# Patient Record
Sex: FEMALE | Race: WHITE | Employment: FULL TIME | URBAN - METROPOLITAN AREA
[De-identification: names, ages, dates, MRNs, and addresses within clinical notes are randomized per-mention and may not be internally consistent; named-entity substitution may affect disease eponyms.]

---

## 2017-03-30 ENCOUNTER — OFFICE VISIT (OUTPATIENT)
Dept: FAMILY MEDICINE CLINIC | Age: 32
End: 2017-03-30

## 2017-03-30 VITALS
BODY MASS INDEX: 37.34 KG/M2 | WEIGHT: 190.2 LBS | SYSTOLIC BLOOD PRESSURE: 128 MMHG | HEIGHT: 60 IN | HEART RATE: 94 BPM | RESPIRATION RATE: 18 BRPM | OXYGEN SATURATION: 98 % | DIASTOLIC BLOOD PRESSURE: 78 MMHG

## 2017-03-30 DIAGNOSIS — Z76.89 ENCOUNTER TO ESTABLISH CARE: ICD-10-CM

## 2017-03-30 DIAGNOSIS — G44.229 CHRONIC TENSION-TYPE HEADACHE, NOT INTRACTABLE: ICD-10-CM

## 2017-03-30 DIAGNOSIS — M54.50 CHRONIC BILATERAL LOW BACK PAIN WITHOUT SCIATICA: ICD-10-CM

## 2017-03-30 DIAGNOSIS — Z11.4 SCREENING FOR HIV WITHOUT PRESENCE OF RISK FACTORS: Primary | ICD-10-CM

## 2017-03-30 DIAGNOSIS — E66.09 NON MORBID OBESITY DUE TO EXCESS CALORIES: ICD-10-CM

## 2017-03-30 DIAGNOSIS — G89.29 CHRONIC BILATERAL LOW BACK PAIN WITHOUT SCIATICA: ICD-10-CM

## 2017-03-30 LAB
A/G RATIO: 1.3 (ref 1.1–2.2)
ALBUMIN SERPL-MCNC: 4.1 G/DL (ref 3.4–5)
ALP BLD-CCNC: 87 U/L (ref 40–129)
ALT SERPL-CCNC: 21 U/L (ref 10–40)
ANION GAP SERPL CALCULATED.3IONS-SCNC: 15 MMOL/L (ref 3–16)
AST SERPL-CCNC: 16 U/L (ref 15–37)
BILIRUB SERPL-MCNC: 0.6 MG/DL (ref 0–1)
BUN BLDV-MCNC: 10 MG/DL (ref 7–20)
CALCIUM SERPL-MCNC: 9.2 MG/DL (ref 8.3–10.6)
CHLORIDE BLD-SCNC: 102 MMOL/L (ref 99–110)
CHOLESTEROL, TOTAL: 168 MG/DL (ref 0–199)
CO2: 22 MMOL/L (ref 21–32)
CREAT SERPL-MCNC: 0.7 MG/DL (ref 0.6–1.1)
GFR AFRICAN AMERICAN: >60
GFR NON-AFRICAN AMERICAN: >60
GLOBULIN: 3.1 G/DL
GLUCOSE BLD-MCNC: 90 MG/DL (ref 70–99)
HDLC SERPL-MCNC: 57 MG/DL (ref 40–60)
LDL CHOLESTEROL CALCULATED: 96 MG/DL
POTASSIUM SERPL-SCNC: 4.4 MMOL/L (ref 3.5–5.1)
SODIUM BLD-SCNC: 139 MMOL/L (ref 136–145)
TOTAL PROTEIN: 7.2 G/DL (ref 6.4–8.2)
TRIGL SERPL-MCNC: 77 MG/DL (ref 0–150)
TSH REFLEX: 2.95 UIU/ML (ref 0.27–4.2)
VLDLC SERPL CALC-MCNC: 15 MG/DL

## 2017-03-30 PROCEDURE — 36415 COLL VENOUS BLD VENIPUNCTURE: CPT | Performed by: FAMILY MEDICINE

## 2017-03-30 PROCEDURE — 99203 OFFICE O/P NEW LOW 30 MIN: CPT | Performed by: FAMILY MEDICINE

## 2017-03-30 RX ORDER — AMITRIPTYLINE HYDROCHLORIDE 25 MG/1
25 TABLET, FILM COATED ORAL NIGHTLY
COMMUNITY
End: 2017-04-20

## 2017-03-30 RX ORDER — TIZANIDINE 4 MG/1
TABLET ORAL
Qty: 30 TABLET | Refills: 1 | Status: SHIPPED | OUTPATIENT
Start: 2017-03-30 | End: 2017-07-05 | Stop reason: SDUPTHER

## 2017-03-30 ASSESSMENT — ENCOUNTER SYMPTOMS
BACK PAIN: 1
CONSTIPATION: 0
SORE THROAT: 0
SHORTNESS OF BREATH: 0
DIARRHEA: 0
VOICE CHANGE: 0
VOMITING: 0
COUGH: 1
EYE ITCHING: 0
NAUSEA: 0
ABDOMINAL PAIN: 0
EYE PAIN: 0

## 2017-03-30 ASSESSMENT — PATIENT HEALTH QUESTIONNAIRE - PHQ9
1. LITTLE INTEREST OR PLEASURE IN DOING THINGS: 1
SUM OF ALL RESPONSES TO PHQ9 QUESTIONS 1 & 2: 2
2. FEELING DOWN, DEPRESSED OR HOPELESS: 1
SUM OF ALL RESPONSES TO PHQ QUESTIONS 1-9: 2

## 2017-03-31 LAB
HIV-1 AND HIV-2 ANTIBODIES: NORMAL
TSH SERPL DL<=0.05 MIU/L-ACNC: 3.19 UIU/ML (ref 0.27–4.2)

## 2017-04-20 ENCOUNTER — OFFICE VISIT (OUTPATIENT)
Dept: FAMILY MEDICINE CLINIC | Age: 32
End: 2017-04-20

## 2017-04-20 VITALS
WEIGHT: 188.6 LBS | SYSTOLIC BLOOD PRESSURE: 122 MMHG | BODY MASS INDEX: 37.03 KG/M2 | DIASTOLIC BLOOD PRESSURE: 80 MMHG | OXYGEN SATURATION: 98 % | HEART RATE: 83 BPM | HEIGHT: 60 IN

## 2017-04-20 DIAGNOSIS — G89.29 CHRONIC BILATERAL LOW BACK PAIN WITHOUT SCIATICA: Primary | ICD-10-CM

## 2017-04-20 DIAGNOSIS — E66.09 NON MORBID OBESITY DUE TO EXCESS CALORIES: ICD-10-CM

## 2017-04-20 DIAGNOSIS — M54.50 CHRONIC BILATERAL LOW BACK PAIN WITHOUT SCIATICA: Primary | ICD-10-CM

## 2017-04-20 PROCEDURE — 99213 OFFICE O/P EST LOW 20 MIN: CPT | Performed by: FAMILY MEDICINE

## 2017-04-20 RX ORDER — TIZANIDINE 4 MG/1
TABLET ORAL
Qty: 30 TABLET | Refills: 3 | Status: CANCELLED | OUTPATIENT
Start: 2017-04-20

## 2017-04-20 ASSESSMENT — ENCOUNTER SYMPTOMS
COUGH: 0
NAUSEA: 0
BACK PAIN: 1
SCALP TENDERNESS: 0
SORE THROAT: 0
SINUS PRESSURE: 0
VOMITING: 0
PHOTOPHOBIA: 1

## 2017-06-15 ENCOUNTER — OFFICE VISIT (OUTPATIENT)
Dept: FAMILY MEDICINE CLINIC | Age: 32
End: 2017-06-15

## 2017-06-15 VITALS
HEIGHT: 60 IN | OXYGEN SATURATION: 98 % | DIASTOLIC BLOOD PRESSURE: 68 MMHG | TEMPERATURE: 98.5 F | SYSTOLIC BLOOD PRESSURE: 124 MMHG | HEART RATE: 72 BPM | BODY MASS INDEX: 36.99 KG/M2 | WEIGHT: 188.4 LBS

## 2017-06-15 DIAGNOSIS — R21 RASH OF FACE: ICD-10-CM

## 2017-06-15 DIAGNOSIS — H10.503 BLEPHAROCONJUNCTIVITIS OF BOTH EYES, UNSPECIFIED BLEPHAROCONJUNCTIVITIS TYPE: Primary | ICD-10-CM

## 2017-06-15 PROCEDURE — 99213 OFFICE O/P EST LOW 20 MIN: CPT | Performed by: FAMILY MEDICINE

## 2017-06-15 RX ORDER — DIPHENHYDRAMINE HCL 25 MG
25 CAPSULE ORAL EVERY 4 HOURS PRN
Qty: 20 CAPSULE | Refills: 1 | Status: SHIPPED | OUTPATIENT
Start: 2017-06-15 | End: 2017-06-24 | Stop reason: SDUPTHER

## 2017-06-15 RX ORDER — OFLOXACIN 3 MG/ML
2 SOLUTION/ DROPS OPHTHALMIC 4 TIMES DAILY
Qty: 10 ML | Refills: 0 | Status: SHIPPED | OUTPATIENT
Start: 2017-06-15 | End: 2017-06-25

## 2017-06-15 ASSESSMENT — ENCOUNTER SYMPTOMS
SORE THROAT: 1
WHEEZING: 0
CONSTIPATION: 0
BACK PAIN: 1
SPUTUM PRODUCTION: 0
SHORTNESS OF BREATH: 0
EYE DISCHARGE: 1
COUGH: 1
HEMOPTYSIS: 0
DIARRHEA: 0
BLURRED VISION: 0
ABDOMINAL PAIN: 0
VOMITING: 0
NAUSEA: 0
DOUBLE VISION: 0
EYE PAIN: 0
EYE REDNESS: 1

## 2017-06-24 DIAGNOSIS — H10.503 BLEPHAROCONJUNCTIVITIS OF BOTH EYES, UNSPECIFIED BLEPHAROCONJUNCTIVITIS TYPE: ICD-10-CM

## 2017-06-24 DIAGNOSIS — R21 RASH OF FACE: ICD-10-CM

## 2017-06-26 RX ORDER — DIPHENHYDRAMINE HCL 25 MG
CAPSULE ORAL
Qty: 20 CAPSULE | Refills: 0 | Status: SHIPPED | OUTPATIENT
Start: 2017-06-26 | End: 2017-07-07 | Stop reason: SDUPTHER

## 2017-07-05 DIAGNOSIS — M54.50 CHRONIC BILATERAL LOW BACK PAIN WITHOUT SCIATICA: ICD-10-CM

## 2017-07-05 DIAGNOSIS — G89.29 CHRONIC BILATERAL LOW BACK PAIN WITHOUT SCIATICA: ICD-10-CM

## 2017-07-05 RX ORDER — TIZANIDINE 4 MG/1
TABLET ORAL
Qty: 30 TABLET | Refills: 1 | Status: SHIPPED | OUTPATIENT
Start: 2017-07-05 | End: 2017-08-21 | Stop reason: SDUPTHER

## 2017-07-27 ENCOUNTER — OFFICE VISIT (OUTPATIENT)
Dept: FAMILY MEDICINE CLINIC | Age: 32
End: 2017-07-27

## 2017-07-27 VITALS
HEART RATE: 68 BPM | DIASTOLIC BLOOD PRESSURE: 88 MMHG | OXYGEN SATURATION: 97 % | WEIGHT: 186 LBS | BODY MASS INDEX: 36.33 KG/M2 | RESPIRATION RATE: 18 BRPM | SYSTOLIC BLOOD PRESSURE: 130 MMHG

## 2017-07-27 DIAGNOSIS — G43.009 MIGRAINE WITHOUT AURA AND WITHOUT STATUS MIGRAINOSUS, NOT INTRACTABLE: Primary | ICD-10-CM

## 2017-07-27 DIAGNOSIS — R03.0 HIGH BLOOD PRESSURE (NOT HYPERTENSION): ICD-10-CM

## 2017-07-27 PROCEDURE — 99214 OFFICE O/P EST MOD 30 MIN: CPT | Performed by: FAMILY MEDICINE

## 2017-07-27 RX ORDER — HYDROCHLOROTHIAZIDE 25 MG/1
TABLET ORAL
Qty: 30 TABLET | Refills: 3 | Status: SHIPPED | OUTPATIENT
Start: 2017-07-27 | End: 2018-02-01 | Stop reason: ALTCHOICE

## 2017-07-27 RX ORDER — SUMATRIPTAN 100 MG/1
100 TABLET, FILM COATED ORAL
Qty: 9 TABLET | Refills: 3 | Status: SHIPPED | OUTPATIENT
Start: 2017-07-27 | End: 2018-02-01 | Stop reason: ALTCHOICE

## 2017-07-27 ASSESSMENT — ENCOUNTER SYMPTOMS
DIARRHEA: 0
CONSTIPATION: 0
VOMITING: 0
SHORTNESS OF BREATH: 0
HEMOPTYSIS: 0
EYE DISCHARGE: 0
COUGH: 0
ABDOMINAL PAIN: 0
BLURRED VISION: 0
SPUTUM PRODUCTION: 0
DOUBLE VISION: 0
WHEEZING: 0
EYE PAIN: 0
SORE THROAT: 0
NAUSEA: 0
EYE REDNESS: 0
BACK PAIN: 1

## 2017-08-21 DIAGNOSIS — G89.29 CHRONIC BILATERAL LOW BACK PAIN WITHOUT SCIATICA: ICD-10-CM

## 2017-08-21 DIAGNOSIS — M54.50 CHRONIC BILATERAL LOW BACK PAIN WITHOUT SCIATICA: ICD-10-CM

## 2017-08-22 RX ORDER — TIZANIDINE 4 MG/1
TABLET ORAL
Qty: 30 TABLET | Refills: 5 | Status: SHIPPED | OUTPATIENT
Start: 2017-08-22 | End: 2018-02-01 | Stop reason: ALTCHOICE

## 2017-11-13 ENCOUNTER — HOSPITAL ENCOUNTER (OUTPATIENT)
Dept: GENERAL RADIOLOGY | Age: 32
Discharge: HOME | End: 2017-11-13
Attending: FAMILY MEDICINE | Admitting: FAMILY MEDICINE

## 2017-11-13 DIAGNOSIS — M26.629 TMJ SYNDROME: ICD-10-CM

## 2018-07-19 ENCOUNTER — HOSPITAL ENCOUNTER (OUTPATIENT)
Dept: ULTRASOUND IMAGING | Age: 33
Discharge: HOME | End: 2018-07-19
Attending: FAMILY MEDICINE | Admitting: FAMILY MEDICINE

## 2018-07-19 DIAGNOSIS — R10.31 RIGHT LOWER QUADRANT PAIN: ICD-10-CM

## 2018-07-30 ENCOUNTER — OFFICE VISIT (OUTPATIENT)
Dept: GASTROENTEROLOGY | Age: 33
End: 2018-07-30

## 2018-07-30 VITALS
WEIGHT: 185 LBS | SYSTOLIC BLOOD PRESSURE: 122 MMHG | HEIGHT: 60 IN | HEART RATE: 79 BPM | BODY MASS INDEX: 36.32 KG/M2 | OXYGEN SATURATION: 98 % | DIASTOLIC BLOOD PRESSURE: 84 MMHG

## 2018-07-30 DIAGNOSIS — R11.0 NAUSEA: ICD-10-CM

## 2018-07-30 DIAGNOSIS — R10.33 PERIUMBILICAL ABDOMINAL PAIN: Primary | ICD-10-CM

## 2018-07-30 DIAGNOSIS — R19.7 DIARRHEA, UNSPECIFIED TYPE: ICD-10-CM

## 2018-07-30 DIAGNOSIS — R19.4 ALTERED BOWEL HABITS: ICD-10-CM

## 2018-07-30 DIAGNOSIS — R14.0 ABDOMINAL BLOATING: ICD-10-CM

## 2018-07-30 PROCEDURE — 99204 OFFICE O/P NEW MOD 45 MIN: CPT | Performed by: NURSE PRACTITIONER

## 2018-07-30 PROCEDURE — G8417 CALC BMI ABV UP PARAM F/U: HCPCS | Performed by: NURSE PRACTITIONER

## 2018-07-30 PROCEDURE — 1036F TOBACCO NON-USER: CPT | Performed by: NURSE PRACTITIONER

## 2018-07-30 PROCEDURE — G8427 DOCREV CUR MEDS BY ELIG CLIN: HCPCS | Performed by: NURSE PRACTITIONER

## 2018-07-30 RX ORDER — TOPIRAMATE 100 MG/1
100 TABLET, FILM COATED ORAL 2 TIMES DAILY
COMMUNITY

## 2018-07-30 RX ORDER — TIZANIDINE 4 MG/1
4 TABLET ORAL NIGHTLY
COMMUNITY
End: 2021-07-10

## 2018-07-30 RX ORDER — SUMATRIPTAN 50 MG/1
100 TABLET, FILM COATED ORAL PRN
COMMUNITY

## 2018-07-30 ASSESSMENT — ENCOUNTER SYMPTOMS
COUGH: 0
WHEEZING: 0
ABDOMINAL PAIN: 1
NAUSEA: 1
SHORTNESS OF BREATH: 0
DIARRHEA: 1
BLURRED VISION: 0
HEARTBURN: 0
CONSTIPATION: 1
SPUTUM PRODUCTION: 0
VOMITING: 0
BACK PAIN: 0
BLOOD IN STOOL: 0
DOUBLE VISION: 0
PHOTOPHOBIA: 0

## 2018-07-30 NOTE — PATIENT INSTRUCTIONS
Patient Education        Abdominal Pain: Care Instructions  Your Care Instructions    Abdominal pain has many possible causes. Some aren't serious and get better on their own in a few days. Others need more testing and treatment. If your pain continues or gets worse, you need to be rechecked and may need more tests to find out what is wrong. You may need surgery to correct the problem. Don't ignore new symptoms, such as fever, nausea and vomiting, urination problems, pain that gets worse, and dizziness. These may be signs of a more serious problem. Your doctor may have recommended a follow-up visit in the next 8 to 12 hours. If you are not getting better, you may need more tests or treatment. The doctor has checked you carefully, but problems can develop later. If you notice any problems or new symptoms, get medical treatment right away. Follow-up care is a key part of your treatment and safety. Be sure to make and go to all appointments, and call your doctor if you are having problems. It's also a good idea to know your test results and keep a list of the medicines you take. How can you care for yourself at home? · Rest until you feel better. · To prevent dehydration, drink plenty of fluids, enough so that your urine is light yellow or clear like water. Choose water and other caffeine-free clear liquids until you feel better. If you have kidney, heart, or liver disease and have to limit fluids, talk with your doctor before you increase the amount of fluids you drink. · If your stomach is upset, eat mild foods, such as rice, dry toast or crackers, bananas, and applesauce. Try eating several small meals instead of two or three large ones. · Wait until 48 hours after all symptoms have gone away before you have spicy foods, alcohol, and drinks that contain caffeine. · Do not eat foods that are high in fat. · Avoid anti-inflammatory medicines such as aspirin, ibuprofen (Advil, Motrin), and naproxen (Aleve). These can cause stomach upset. Talk to your doctor if you take daily aspirin for another health problem. When should you call for help? Call 911 anytime you think you may need emergency care. For example, call if:    · You passed out (lost consciousness).     · You pass maroon or very bloody stools.     · You vomit blood or what looks like coffee grounds.     · You have new, severe belly pain.    Call your doctor now or seek immediate medical care if:    · Your pain gets worse, especially if it becomes focused in one area of your belly.     · You have a new or higher fever.     · Your stools are black and look like tar, or they have streaks of blood.     · You have unexpected vaginal bleeding.     · You have symptoms of a urinary tract infection. These may include:  ¨ Pain when you urinate. ¨ Urinating more often than usual.  ¨ Blood in your urine.     · You are dizzy or lightheaded, or you feel like you may faint.    Watch closely for changes in your health, and be sure to contact your doctor if:    · You are not getting better after 1 day (24 hours). Where can you learn more? Go to https://AskforTaskpeKonutkredisi.com.tr.Wallix. org and sign in to your Storee account. Enter N972 in the Link Trigger box to learn more about \"Abdominal Pain: Care Instructions. \"     If you do not have an account, please click on the \"Sign Up Now\" link. Current as of: November 20, 2017  Content Version: 11.6  © 0842-6565 [employer]. Care instructions adapted under license by [facility] [address] If you have questions about a medical condition or this instruction, always ask your healthcare professional. [place] [address] any warranty or liability for your use of this information. Patient Education        Gas and Bloating: Care Instructions  Your Care Instructions    Gas and bloating can be uncomfortable and embarrassing problems.  All people pass gas, but some people produce more gas than others, sometimes enough to cause distress. It is normal to pass gas from 6 to 20 times per day. Excess gas usually is not caused by a serious health problem. Gas and bloating usually are caused by something you eat or drink, including some food supplements and medicines. Gas and bloating are usually harmless and go away without treatment. However, changing your diet can help end the problem. Some over-the-counter medicines can help prevent gas and relieve bloating. Follow-up care is a key part of your treatment and safety. Be sure to make and go to all appointments, and call your doctor if you are having problems. It's also a good idea to know your test results and keep a list of the medicines you take. How can you care for yourself at home? · Keep a food diary if you think a food gives you gas. Write down what you eat or drink. Also record when you get gas. If you notice that a food seems to cause your gas each time, avoid it and see if the gas goes away. Examples of foods that cause gas include:  ¨ Fried and fatty foods. ¨ Beans. ¨ Vegetables such as artichokes, asparagus, broccoli, brussels sprouts, cabbage, cauliflower, cucumbers, green peppers, onions, peas, radishes, and raw potatoes. ¨ Fruits such as apricots, bananas, melons, peaches, pears, prunes, and raw apples. ¨ Wheat and wheat bran. · Soak dry beans in water overnight, then dump the water and cook the soaked beans in new water. This can help prevent gas and bloating. · If you have problems with lactose, avoid dairy products such as milk and cheese. · Try not to swallow air. Do not drink through a straw, gulp your food, or chew gum. · Take an over-the-counter medicine. Read and follow all instructions on the label. ¨ Food enzymes, such as Beano, can be added to gas-producing foods to prevent gas. ¨ Antacids, such as Maalox Anti-Gas and Mylanta Gas, can relieve bloating by making you burp.  Be careful when you take over-the-counter antacid medicines. Many of these medicines have aspirin in them. Read the label to make sure that you are not taking more than the recommended dose. Too much aspirin can be harmful. ¨ Activated charcoal tablets, such as CharcoCaps, may decrease odor from gas you pass. ¨ If you have problems with lactose, you can take medicines such as Dairy Ease and Lactaid with dairy products to prevent gas and bloating. · Get some exercise regularly. When should you call for help? Call 911 anytime you think you may need emergency care. For example, call if:    · You have gas and signs of a heart attack, such as:  ¨ Chest pain or pressure. ¨ Sweating. ¨ Shortness of breath. ¨ Nausea or vomiting. ¨ Pain that spreads from the chest to the neck, jaw, or one or both shoulders or arms. ¨ Dizziness or lightheadedness. ¨ A fast or uneven pulse. After calling 911, chew 1 adult-strength aspirin. Wait for an ambulance. Do not try to drive yourself.    Call your doctor now or seek immediate medical care if:    · You have severe belly pain.     · You have blood in your stool.    Watch closely for changes in your health, and be sure to contact your doctor if:    · You have blood or pus in your urine.     · Your urine is cloudy or smells bad.     · You are burping and have trouble swallowing.     · You feel bloated and have swelling in your belly.     · You do not get better as expected. Where can you learn more? Go to https://3D Industri.es.GinzaMetrics. org and sign in to your TranslateMedia account. Enter J114 in the MemberTender.[employer]emours Foundation box to learn more about \"Gas and Bloating: Care Instructions. \"     If you do not have an account, please click on the \"Sign Up Now\" link. Current as of: November 20, 2017  Content Version: 11.6  © 8966-6924 Doc[employer]nx, [employer]. Care instructions adapted under license by [facility] ([facility]).  If you have questions about a medical condition or this instruction, always ask your healthcare professional. WinView, [facility] disclaims any warranty or liability for your use of this information. Patient Education        Irritable Bowel Syndrome: Care Instructions  Your Care Instructions  Irritable bowel syndrome, or IBS, is a problem with the intestines that causes belly pain, bloating, gas, constipation, and diarrhea. The cause of IBS is not well known. IBS can last for many years, but it does not get worse over time or lead to serious disease. Most people can control their symptoms by changing their diet and reducing stress. Follow-up care is a key part of your treatment and safety. Be sure to make and go to all appointments, and call your doctor if you are having problems. It's also a good idea to know your test results and keep a list of the medicines you take. How can you care for yourself at home? · For constipation:  ¨ Include fruits, vegetables, beans, and whole grains in your diet each day. These foods are high in fiber. ¨ Drink plenty of fluids, enough so that your urine is light yellow or clear like water. If you have kidney, heart, or liver disease and have to limit fluids, talk with your doctor before you increase the amount of fluids you drink. ¨ Get some exercise every day. Build up slowly to 30 to 60 minutes a day on 5 or more days of the week. ¨ Take a fiber supplement, such as Citrucel or Metamucil, every day if needed. Read and follow all instructions on the label. ¨ Schedule time each day for a bowel movement. Having a daily routine may help. Take your time and do not strain when having a bowel movement. · If you often have diarrhea, limit foods and drinks that make it worse. These are different for each person but may include caffeine (found in coffee, tea, chocolate, and cola drinks), alcohol, fatty foods, gas-producing foods (such as beans, cabbage, and broccoli), some dairy products, and spicy foods. Do not eat candy or gum that contains sorbitol.   · Keep a daily diary of what you eat and what symptoms you have. This may help find foods that cause you problems. · Eat slowly. Try to make mealtime relaxing. · Find ways to reduce stress. · Get at least 30 minutes of exercise on most days of the week. Exercise can help reduce tension and prevent constipation. Walking is a good choice. You also may want to do other activities, such as running, swimming, cycling, or playing tennis or team sports. When should you call for help? Call your doctor now or seek immediate medical care if:    · Your pain is different than usual or occurs with fever.     · You lose weight without trying, or you lose your appetite and you do not know why.     · Your symptoms often wake you from sleep.     · Your stools are black and tarlike or have streaks of blood.    Watch closely for changes in your health, and be sure to contact your doctor if:    · Your IBS symptoms get worse or begin to disrupt your day-to-day life.     · You become more tired than usual.     · Your home treatment stops working. Where can you learn more? Go to https://[facility]. org and sign in to your Marcato Digital Solutions account. Enter U415 in the CamioCam box to learn more about \"Irritable Bowel Syndrome: Care Instructions. \"     If you do not have an account, please click on the \"Sign Up Now\" link. Current as of: May 12, 2017  Content Version: 11.6  © 6271-2287 Sanrad, Dekkun. Care instructions adapted under license by [facility] ([facility]). If you have questions about a medical condition or this instruction, always ask your healthcare professional. [place] [address] any warranty or liability for your use of this information. Patient Education        Diet for Irritable Bowel Syndrome: Care Instructions  Your Care Instructions    Irritable bowel syndrome, or IBS, is a problem with the intestines. IBS can cause belly pain, bloating, gas, constipation, and diarrhea.  Most people can control their symptoms by changing their diet and easing stress. No specific foods cause everyone with IBS to have symptoms. Doctors don't offer a specific diet to manage symptoms. But many people find that they feel better when they stop eating certain foods. A high-fiber diet may help if you have constipation. Follow-up care is a key part of your treatment and safety. Be sure to make and go to all appointments, and call your doctor if you are having problems. It's also a good idea to know your test results and keep a list of the medicines you take. How can you care for yourself at home? To reduce constipation  · Include fruits, vegetables, beans, and whole grains in your diet each day. These foods are high in fiber. Slowly increase the amount of fiber you eat. This helps you avoid a lot of gas. · Drink plenty of fluids, enough so that your urine is light yellow or clear like water. If you have kidney, heart, or liver disease and have to limit fluids, talk with your doctor before you increase the amount of fluids you drink. · Get some exercise every day. Build up slowly to 30 to 60 minutes a day on 5 or more days of the week. · Take a fiber supplement, such as Citrucel or Metamucil, every day if needed. Read and follow all instructions on the label. · Schedule time each day for a bowel movement. Having a daily routine may help. Take your time and do not strain when having a bowel movement. · Check with your doctor before you increase the amount of fiber in your diet. For some people who have IBS, eating more fiber may make some symptoms worse. This includes bloating. To reduce diarrhea  You may try giving up foods or drinks one at a time to see whether symptoms improve.  Limit or avoid the following:  · Alcohol  · Caffeine, which is found in coffee, tea, cola drinks, and chocolate  · Nicotine, from smoking or chewing tobacco  · Gas-producing foods, such as beans, broccoli, cabbage, and apples  · Dairy products that contain lactose (milk sugar), such as ice cream, milk, cheese, and sour cream  · Foods and drinks high in sugar, especially fruit juice, soda, candy, and other packaged sweets (such as cookies)  · Foods high in fat, including ortega, sausage, butter, oils, and anything deep-fried  · Sorbitol and xylitol, artificial sweeteners found in some sugarless candies and chewing gum  Keep track of foods  · Some people with IBS use a daily food diary to keep track of what they eat and whether they have any symptoms after eating certain foods. The diary also can be a good way to record what is going on in your life. · Stress plays a role in IBS. So if you are aware that certain stresses bring on symptoms, you can try to reduce those stresses. Keep mealtimes pleasant  · Try to maintain a pleasant environment when you eat. This may reduce stress that can make symptoms likely to occur. · Give yourself plenty of time to eat, rather than eating on the go. Chew your food slowly. Try not to swallow air, which can cause bloating. Where can you learn more? Go to https://[employer].Spokeable. org and sign in to your GainSpan account. Enter L373 in the [facility] box to learn more about \"Diet for Irritable Bowel Syndrome: Care Instructions. \"     If you do not have an account, please click on the \"Sign Up Now\" link. Current as of: May 12, 2017  Content Version: 11.6  © 5490-2535 LiquidPiston. Care instructions adapted under license by [facility] ([facility]). If you have questions about a medical condition or this instruction, always ask your healthcare professional. [place] [address] any warranty or liability for your use of this information. Patient Education        CT Scan of the Abdomen: About This Test  What is it? A CT (computed tomography) scan uses X-rays to make detailed pictures of your body and structures inside your body.  A CT scan of the abdomen (belly) can give your doctor information about your liver, pancreas, kidneys, and other structures in your belly. During the test, you will lie on a table that is attached to the Grid Mobile. The CT scanner is a large doughnut-shaped machine. Why is this test done? A CT scan of the belly can help find problems such as kidney stones, infected pouches in the colon (diverticulitis), and appendicitis. It also helps find tumors and abscesses. How can you prepare for the test?  Talk to your doctor about all your health conditions before the test. For example, tell your doctor if:  · You are or might be pregnant. · You are allergic to any medicines. · You have diabetes. · You take metformin. · You are breastfeeding. · You get nervous in confined spaces. You may need medicine to help you relax. · You have had an X-ray test using barium contrast material in the past 4 days. You may be asked to not eat any solid foods starting the night before your scan. What happens before the test?  · You may have to take off jewelry. · You will take off all or most of your clothes and change into a gown. If you do leave some clothes on, make sure you take everything out of your pockets. · You may have contrast material (dye) put into your arm through a tube called an IV. Or, you may drink the contrast material or it may be put through a tube into your bladder or rectum. Contrast material helps doctors see specific organs, blood vessels, and most tumors. What happens during the test?  · You will lie on a table that is attached to the CT scanner. · The table slides into the round opening of the scanner. The table will move during the scan. The scanner moves inside the doughnut-shaped casing around your body. · You will be asked to hold still during the scan. You may be asked to hold your breath for short periods.   · You may be alone in the scanning room, but a technologist will be watching you through a window and talking with you during the test.  What else should you know about the test?  · A CT scan does not hurt. · If a dye is used, you may feel a quick sting or pinch when the IV is started. The dye may make you feel warm and flushed and give you a metallic taste in your mouth. Some people feel sick to their stomach or get a headache. · If you breastfeed and are concerned about whether the dye used in this test is safe, talk to your doctor. Most experts believe that very little dye passes into breast milk and even less is passed on to the baby. But if you prefer, you can store some of your breast milk ahead of time and use it for a day or two after the test.  · The dose of radiation from a CT scanner may be higher than that from other X-ray tests. If you are concerned about the radiation risk, talk to your doctor. How long does the test take? · The test will take about 30 to 60 minutes. Most of this time is spent getting ready for the scan. The actual test only takes a few minutes. What happens after the test?  · You will probably be able to go home right away. · You can go back to your usual activities right away. · Drink plenty of fluids for 24 hours after the test if dye was used, unless your doctor tells you not to. When should you call for help? Watch closely for changes in your health, and be sure to contact your doctor if you have any problems. Follow-up care is a key part of your treatment and safety. Be sure to make and go to all appointments, and call your doctor if you are having problems. It's also a good idea to keep a list of the medicines you take. Ask your doctor when you can expect to have your test results. Where can you learn more? Go to https://ClearCyclescarletteb.CaptiveMotion. org and sign in to your Caralon Global account. Enter Y907 in the CareToSave box to learn more about \"CT Scan of the Abdomen: About This Test.\"     If you do not have an account, please click on the \"Sign Up Now\" link.   Current as of: October bleeding or interact with anesthesia.     · If you take blood thinners, such as warfarin (Coumadin), clopidogrel (Plavix), or aspirin, be sure to talk to your doctor. He or she will tell you if you should stop taking these medicines before your procedure. Make sure that you understand exactly what your doctor wants you to do.     · Your doctor will tell you which medicines to take or stop before your procedure. You may need to stop taking certain medicines a week or more before the procedure. So talk to your doctor as soon as you can.     · If you have an advance directive, let your doctor know. It may include a living will and a durable power of  for health care. Bring a copy to the hospital. If you don't have one, you may want to prepare one. It lets your doctor and loved ones know your health care wishes. Doctors advise that everyone prepare these papers before any type of surgery or procedure. Procedures can be stressful. This information will help you understand what you can expect. And it will help you safely prepare for your procedure. What happens on the day of the procedure? · Follow the instructions exactly about when to stop eating and drinking. If you don't, your procedure may be canceled. If your doctor told you to take your medicines on the day of the procedure, take them with only a sip of water.     · Take a bath or shower before you come in for your procedure. Do not apply lotions, perfumes, deodorants, or nail polish.     · Take off all jewelry and piercings. And take out contact lenses, if you wear them.    At the hospital or surgery center   · Bring a picture ID.     · The test may take 15 to 30 minutes.     · The doctor may spray medicine on the back of your throat to numb it. You also will get medicine to prevent pain and to relax you.     · You will lie on your left side. The doctor will put the scope in your mouth and toward the back of your throat.  The doctor will tell you when into your anus. Then the doctor puts the scope in and moves it into your colon. The scope goes in easily because it is lubricated.     · The doctor may also use small tools to take tissue samples for a biopsy or to remove polyps. This does not hurt.     · The test usually takes 30 to 45 minutes. But it may take longer. It depends on what is found and what is done. Going home   · Be sure you have someone to drive you home. Anesthesia and pain medicine make it unsafe for you to drive.     · You will be given more specific instructions about recovering from your procedure. When should you call your doctor? · You have questions or concerns.     · You don't understand how to prepare for your procedure.     · You are having trouble with the bowel prep.     · You become ill before the procedure (such as fever, flu, or a cold).     · You need to reschedule or have changed your mind about having the procedure. Where can you learn more? Go to https://Board a Boat.KinderLab Robotics. org and sign in to your 7fgame account. Enter C315 in the TrackerSphere box to learn more about \"Colonoscopy: Before Your Procedure. \"     If you do not have an account, please click on the \"Sign Up Now\" link. Current as of: May 12, 2017  Content Version: 11.6  © 7409-7684 [employer]. Care instructions adapted under license by [facility] ([facility]). If you have questions about a medical condition or this instruction, always ask your healthcare professional. [place] [address] any warranty or liability for your use of this information.

## 2018-07-30 NOTE — PROGRESS NOTES
use drugs. Family history:  family history includes Breast Cancer in her maternal aunt; Cancer in her maternal grandmother; Diabetes in her maternal grandmother; High Blood Pressure in her father; No Known Problems in her mother, sister, and sister. Objective    Vitals:    07/30/18 0900   BP: 122/84   Pulse: 79   SpO2: 98%        Physical exam    Physical Exam   Constitutional: She is oriented to person, place, and time and well-developed, well-nourished, and in no distress. HENT:   Head: Normocephalic and atraumatic. Eyes: Conjunctivae and EOM are normal. Pupils are equal, round, and reactive to light. Neck: Normal range of motion. Neck supple. No JVD present. No tracheal deviation present. No thyromegaly present. Cardiovascular: Normal rate, regular rhythm, normal heart sounds and intact distal pulses. Exam reveals no gallop and no friction rub. No murmur heard. Pulmonary/Chest: Effort normal and breath sounds normal. No respiratory distress. She has no wheezes. She has no rales. She exhibits no tenderness. Abdominal: Soft. Bowel sounds are normal. She exhibits no distension and no mass. There is tenderness (periumbilical area). There is no rebound and no guarding. Obese   Musculoskeletal: Normal range of motion. Lymphadenopathy:     She has no cervical adenopathy. Neurological: She is alert and oriented to person, place, and time. Skin: Skin is warm and dry. Psychiatric: Affect normal.   Vitals reviewed.       Admission on 07/20/2018, Discharged on 07/20/2018   Component Date Value Ref Range Status    WBC 07/20/2018 13.7* 4.0 - 10.5 K/CU MM Final    RBC 07/20/2018 4.57  4.2 - 5.4 M/CU MM Final    Hemoglobin 07/20/2018 14.1  12.5 - 16.0 GM/DL Final    Hematocrit 07/20/2018 42.7  37 - 47 % Final    MCV 07/20/2018 93.4  78 - 100 FL Final    MCH 07/20/2018 30.9  27 - 31 PG Final    MCHC 07/20/2018 33.0  32.0 - 36.0 % Final    RDW 07/20/2018 12.6  11.7 - 14.9 % Final    Platelets 07/20/2018 336  140 - 440 K/CU MM Final    MPV 07/20/2018 10.2  7.5 - 11.1 FL Final    Differential Type 07/20/2018 AUTOMATED DIFFERENTIAL   Final    Segs Relative 07/20/2018 60.1  36 - 66 % Final    Lymphocytes % 07/20/2018 31.9  24 - 44 % Final    Monocytes % 07/20/2018 5.9* 0 - 4 % Final    Eosinophils % 07/20/2018 1.3  0 - 3 % Final    Basophils % 07/20/2018 0.4  0 - 1 % Final    Segs Absolute 07/20/2018 8.2  K/CU MM Final    Lymphocytes # 07/20/2018 4.4  K/CU MM Final    Monocytes # 07/20/2018 0.8  K/CU MM Final    Eosinophils # 07/20/2018 0.2  K/CU MM Final    Basophils # 07/20/2018 0.1  K/CU MM Final    Nucleated RBC % 07/20/2018 0.0  % Final    Total Nucleated RBC 07/20/2018 0.0  K/CU MM Final    Total Immature Neutrophil 07/20/2018 0.05  K/CU MM Final    Immature Neutrophil % 07/20/2018 0.4  0 - 0.43 % Final    Sodium 07/20/2018 141  135 - 145 MMOL/L Final    Potassium 07/20/2018 3.5  3.5 - 5.1 MMOL/L Final    Chloride 07/20/2018 102  99 - 110 mMol/L Final    CO2 07/20/2018 22  21 - 32 MMOL/L Final    BUN 07/20/2018 11  6 - 23 MG/DL Final    CREATININE 07/20/2018 0.8  0.6 - 1.1 MG/DL Final    Glucose 07/20/2018 91  70 - 99 MG/DL Final    Calcium 07/20/2018 9.2  8.3 - 10.6 MG/DL Final    Alb 07/20/2018 4.1  3.4 - 5.0 GM/DL Final    Total Protein 07/20/2018 7.6  6.4 - 8.2 GM/DL Final    Total Bilirubin 07/20/2018 0.8  0.0 - 1.0 MG/DL Final    ALT 07/20/2018 9* 10 - 40 U/L Final    AST 07/20/2018 14* 15 - 37 IU/L Final    Alkaline Phosphatase 07/20/2018 75  40 - 128 IU/L Final    GFR Non- 07/20/2018 >60  >60 mL/min/1.73m2 Final    GFR  07/20/2018 >60  >60 mL/min/1.73m2 Final    Anion Gap 07/20/2018 17* 4 - 16 Final    Lipase 07/20/2018 23  13 - 60 IU/L Final    hCG Quant 07/20/2018 <0.5  UIU/ML Final    Color, UA 07/20/2018 YELLOW  UYELL Final    Clarity, UA 07/20/2018 CLEAR  CLEAR Final    Glucose, Urine 07/20/2018 NEGATIVE  NEG MG/DL Final

## 2018-07-30 NOTE — LETTER
[address] [place] [address]      Your procedure with Dr. Fernie Telles has been scheduled at the     [facility] located at     [address] [place] [address]. 09/24/2018                                          _____ /    10:30am_________________________  Procedure Date       Arrival Time (arrive 1 hour early)      11:30am_____________  Procedure Time                                     If an emergency arises and you are unable to keep your procedure appointment, you must call [facility] at [phone] and our office at [phone] within 24 hours to let us know. Not giving 24 hour notice or not showing for your procedure appointment may result in immediate dismissal from Dr. Jarad collins. COLONOSCOPY  2 DAY BOWEL PREP WITH GoLYTELY, TriLyte, Colyte, or NuLYTELY    To prepare for this procedure, you will need to clean out your bowels or large intestines. Review all the information you are given as soon as you can so that you are prepared and know what to expect during and after your procedure. You will need an adult to drive you home after the procedure. Your  will need to check in with you at the facility so the staff are aware of who they are and needs to stay at the facility during the entire procedure. If you  does leave during the procedure, he or she needs to give the staff a phone number where they can be reached and stay within 30 minutes of the facility. If you are taking a cab, bus, or medical transportation service home after the procedure, an adult, other than the , needs to ride with you for your safety. You should have an adult with you to help you and check on you after the procedure at home for at least 6 hours after the procedure. If you do not have a  with you, your procedure will be rescheduled to another date. You will need to adjust your diet 5 days before the test and begin this bowel prep at 6:00 PM, 2 days before the procedure. You may need to make changes to your medicines. If you take aspirin or NSAIDS, such as ibuprofen, naproxen, or Celebrex for pain, you do NOT need to stop taking these medicines before the procedure. If you take medicines for diabetes, ask the doctor who ordered your diabetes medication how to adjust these medicines for this procedure. If you take any of the blood thinner medications listed below:  ? Ask the doctor who ordered this medication if it is safe for you to stop taking this medicine before the procedure. If you have a stent or certain other health problems, do NOT stop taking these medicines unless otherwise directed by the doctor. ? If your doctor agrees you should stop taking any of the medications listed below, stop for the listed number of days or as your doctor recommends:    o Prudence Tye (Ticagretor)  5 days     o Coumadin (Warfarin)  5 days  o Effient (Prasugrel)  7 days  o Eliquis (Apixaban)  2 days  o Lovenox (Enoxaparin)  1 day  o Plavix (Clopidogrel)  5 days  o Pletal (Cilostazol)  5 days  o Pradaxa (Dabigatran)  2 days  o Savaysa (Edoxaban)  2 days  o Xarelto (Rivaroxaban)  1 day            The week before your procedure    The prescription for your prep kit will be sent to your preferred pharmacy prior to your scheduled procedure. The prep kit should include:  ? A large plastic container that has the medicine powder inside. This will be used to mix the prep. ? Flavor packet that can be added to the container before mixing the prep. (Based on which medicine your doctor ordered, the flavor may already be mixed in with the medicine powder in the container, so there may not be a flavor packet). Ask the pharmacist any questions you may have about mixing or taking the prep.     You will need clear liquids from the list below to drink during the days container. Warm water helps dissolve the powder. ? Put the cap back on the container. ? Shake the container to mix the prep medicine. ? Place the container of prep medicine in the refrigerator so it can chill    6:00 PM through evening (Mixture Part 1)    ? Start to drink the prep medicine mixture. Drink an 8-ounce cup of the mixture every 10 to 15 minutes until you finish one fourth of the mixture. It is better to drink each cup quickly rather than taking small sips. ? Store the rest of the mixture in the refrigerator until tomorrow morning. ? Drink other clear liquids through the evening and tomorrow. 1 day before your procedure    Do not eat any solid foods or eat or drink any milk products today. Drink only clear liquids. Morning (Mixture Part 2)    ? Drink another fourth of the prep mixture you mixed yesterday as you did before. ? Place the mixture back into the refrigerator to cool until the evening. ? Continue to drink other clear liquids through the day. 6:00 PM through evening (Mixture Part 3)    ? Start to drink another fourth of the prep medicine mixture. Drink an 8-ounce cup of the mixture every 10 to 15 minutes until you finish this fourth of the mixture. ? You will need to drink one fourth of the mixture this evening and the other fourth in the morning, 6 hours prior to your procedure. ? Continue to drink other clear liquids through the evening. Morning of your procedure (Mixture Part 4)    ? Six hours prior to your scheduled procedure, drink the rest of the prep medicine mixture as before. You may need to set your alarm to get up to finish your prep medicine. ? Drink two, 8-ounce cups of clear liquids after you finish the prep medicine. ? You can drink clear liquids up to 4 hours before your procedure. You can take a small sip of water to take blood pressure, heart, seizure, or pain medicine within 2 hours of your procedure.  Your procedure will be rescheduled if you drink anything other than a sip of water with medicines. ? Please leave all valuables and jewelry including piercings at home. ? Female patients should come prepared to give a urine sample in case a pregnancy test is needed. ? Bring these items with you to your procedure:  o A list of all medications, including prescription and over the counter medicines and any vitamin or herbal products you are currently taking. o A list of your allergies. o List of medical conditions and previous surgeries. o A copy of advanced directive, such as a living will or power of .  o Your photo identification, insurance card and co-payment. Plan to arrive at the arrival time on your letter so the staff can get you ready. Expect to be at the endoscopy facility 2 to 4 hours for your procedure and recovery time. If you are vomiting up your prep medicine, have not had any bowel movements, or your bowels are not clear, please call [phone]. If this is outside of normal business hours, please leave a detailed voicemail so we can call you back right away, during normal business hours.

## 2018-08-08 ENCOUNTER — HOSPITAL ENCOUNTER (OUTPATIENT)
Dept: OTHER | Age: 33
Discharge: HOME | End: 2018-08-08
Attending: NURSE PRACTITIONER | Admitting: NURSE PRACTITIONER

## 2018-08-09 LAB
CLOSTRIDIUM DIFFICILE, PCR: NORMAL
GIARDIA ANTIGEN STOOL: NEGATIVE
LACTOFERRIN, QUAL: POSITIVE

## 2018-08-10 LAB
FAT QUALITATIVE NEUTRAL STOOL: NORMAL
FAT QUALITATIVE SPLIT STOOL: NORMAL
H PYLORI ANTIGEN STOOL: NEGATIVE

## 2018-08-12 LAB
CULTURE: NORMAL
Lab: NORMAL
REPORT STATUS: NORMAL
SPECIMEN: NORMAL

## 2018-08-13 LAB
OVA AND PARASITE WET MOUNT: NEGATIVE
TRICHROME SMEAR, STOOL O&P: NEGATIVE

## 2018-08-20 ENCOUNTER — TELEPHONE (OUTPATIENT)
Dept: GASTROENTEROLOGY | Age: 33
End: 2018-08-20

## 2018-08-20 ENCOUNTER — HOSPITAL ENCOUNTER (OUTPATIENT)
Dept: CT IMAGING | Age: 33
Discharge: HOME | End: 2018-08-20
Attending: NURSE PRACTITIONER | Admitting: NURSE PRACTITIONER

## 2018-08-20 DIAGNOSIS — R14.0 ABDOMINAL BLOATING: ICD-10-CM

## 2018-08-20 DIAGNOSIS — R11.0 NAUSEA: ICD-10-CM

## 2018-08-20 DIAGNOSIS — R10.33 PERIUMBILICAL ABDOMINAL PAIN: ICD-10-CM

## 2018-08-20 RX ORDER — SODIUM CHLORIDE 0.9 % (FLUSH) 0.9 %
10 SYRINGE (ML) INJECTION ONCE
Status: COMPLETED | OUTPATIENT
Start: 2018-08-20 | End: 2018-08-20

## 2018-08-20 RX ADMIN — Medication 10 ML: at 12:25

## 2018-08-22 ENCOUNTER — TELEPHONE (OUTPATIENT)
Dept: GASTROENTEROLOGY | Age: 33
End: 2018-08-22

## 2018-08-22 DIAGNOSIS — R10.33 PERIUMBILICAL ABDOMINAL PAIN: Primary | ICD-10-CM

## 2018-08-22 DIAGNOSIS — R93.89 ABNORMAL FINDING ON IMAGING: ICD-10-CM

## 2018-08-30 ENCOUNTER — OFFICE VISIT (OUTPATIENT)
Dept: SURGERY | Age: 33
End: 2018-08-30

## 2018-08-30 VITALS
SYSTOLIC BLOOD PRESSURE: 132 MMHG | DIASTOLIC BLOOD PRESSURE: 88 MMHG | HEART RATE: 89 BPM | OXYGEN SATURATION: 98 % | RESPIRATION RATE: 12 BRPM

## 2018-08-30 DIAGNOSIS — K43.9 EPIGASTRIC HERNIA: Primary | ICD-10-CM

## 2018-08-30 PROCEDURE — 1036F TOBACCO NON-USER: CPT | Performed by: SURGERY

## 2018-08-30 PROCEDURE — G8427 DOCREV CUR MEDS BY ELIG CLIN: HCPCS | Performed by: SURGERY

## 2018-08-30 PROCEDURE — 99213 OFFICE O/P EST LOW 20 MIN: CPT | Performed by: SURGERY

## 2018-08-30 PROCEDURE — G8417 CALC BMI ABV UP PARAM F/U: HCPCS | Performed by: SURGERY

## 2018-09-13 ENCOUNTER — TELEPHONE (OUTPATIENT)
Dept: GASTROENTEROLOGY | Age: 33
End: 2018-09-13

## 2018-09-13 ENCOUNTER — PAT TELEPHONE (OUTPATIENT)
Dept: SURGERY | Age: 33
End: 2018-09-13

## 2018-09-13 VITALS — WEIGHT: 185 LBS | HEIGHT: 60 IN | BODY MASS INDEX: 36.32 KG/M2

## 2018-09-13 NOTE — PRE-PROCEDURE INSTRUCTIONS
.   1. Do not eat or drink anything after 0700 on 9/18-unless instructed by your doctor prior to surgery. This includes no water, chewing gum or mints. 2. Follow your directions as prescribed by the doctor for your procedure and medications. 3.Check with your Doctor regarding stopping Plavix, Coumadin, Lovenox,Effient,Pradaxa,Xarelto, Fragmin or other blood thinners and follow their instructions. 4. Do not smoke, and do not drink any alcoholic beverages 24 hours prior to surgery. This includes NA Beer. 5. You may brush your teeth and gargle the morning of surgery. DO NOT SWALLOW WATER   6. You MUST make arrangements for a responsible adult to take you home after your surgery and be able to check on you every couple hours for the day. You will not be allowed to leave alone or drive yourself home. It is strongly suggested someone stay with you the first 24 hrs. Your surgery will be cancelled if you do not have a ride home. 7. Please wear simple, loose fitting clothing to the hospital.  Eldon Reggie not bring valuables (money, credit cards, checkbooks, etc.) Do not wear any makeup (including no eye makeup) or nail polish on your fingers or toes. 8. DO NOT wear any jewelry or piercings on day of surgery. All body piercing jewelry must be removed. 9. If you have dentures, they will be removed before going to the OR; we will provide you a container. If you wear contact lenses or glasses, they will be removed; please bring a case for them. 10. If you  have a Living Will and Durable Power of  for Healthcare, please bring in a copy. 11 Please bring picture ID,  insurance card, paperwork from the doctors office    (H & P, Consent, & card for implantable devices).

## 2018-09-14 ENCOUNTER — TELEPHONE (OUTPATIENT)
Dept: GASTROENTEROLOGY | Age: 33
End: 2018-09-14

## 2018-09-14 NOTE — TELEPHONE ENCOUNTER
Called patient to confirm procedure with Ana Mdadox on 9/18/18 at 1015am, arrive at 915am. Patient confirmed understanding of prep instructions, procedure date/time/arrival time/[facility] address & had no further questions.

## 2018-09-17 NOTE — ANESTHESIA PRE-OP
History of kidney stones     \"had kidney stones age 29- passed it\"    Hypertension     TMJ (dislocation of temporomandibular joint)        Past Surgical History:        Procedure Laterality Date     SECTION  2006 , ( with BPS)       Social History:    Social History   Substance Use Topics    Smoking status: Never Smoker    Smokeless tobacco: Never Used    Alcohol use No                                Counseling given: Not Answered      Vital Signs (Current): There were no vitals filed for this visit. BP Readings from Last 3 Encounters:   18 132/88   18 122/84   18 (!) 136/91       NPO Status:                                                                                 BMI:   Wt Readings from Last 3 Encounters:   18 185 lb (83.9 kg)   18 185 lb (83.9 kg)   18 185 lb (83.9 kg)     There is no height or weight on file to calculate BMI. Anesthesia Evaluation    Airway:         Dental:          Pulmonary:                              Cardiovascular:    (+) hypertension:,          Beta Blocker:  Not on Beta Blocker         Neuro/Psych:   (+) headaches: migraine headaches,             GI/Hepatic/Renal:   (+) morbid obesity          Endo/Other:                     Abdominal:   (+) obese,         Vascular:                                        Anesthesia Plan      general and MAC     ASA 2       Induction: intravenous. Pre Anesthesia Assessment complete.  Chart reviewed on 2018        ELIAS Avendano - CRNA   2018

## 2018-09-18 ENCOUNTER — TELEPHONE (OUTPATIENT)
Dept: GASTROENTEROLOGY | Age: 33
End: 2018-09-18

## 2018-09-18 ENCOUNTER — HOSPITAL ENCOUNTER (OUTPATIENT)
Dept: SURGERY | Age: 33
Discharge: HOME | End: 2018-09-18
Attending: INTERNAL MEDICINE | Admitting: INTERNAL MEDICINE

## 2018-09-18 VITALS
WEIGHT: 183 LBS | OXYGEN SATURATION: 99 % | HEIGHT: 60 IN | DIASTOLIC BLOOD PRESSURE: 91 MMHG | SYSTOLIC BLOOD PRESSURE: 134 MMHG | TEMPERATURE: 97.3 F | HEART RATE: 71 BPM | BODY MASS INDEX: 35.93 KG/M2 | RESPIRATION RATE: 16 BRPM

## 2018-09-18 DIAGNOSIS — R10.84 GENERALIZED ABDOMINAL PAIN: ICD-10-CM

## 2018-09-18 DIAGNOSIS — R19.4 CHANGE IN BOWEL HABIT: ICD-10-CM

## 2018-09-18 LAB — PREGNANCY TEST URINE, POC: NEGATIVE

## 2018-09-18 PROCEDURE — 43239 EGD BIOPSY SINGLE/MULTIPLE: CPT | Performed by: INTERNAL MEDICINE

## 2018-09-18 PROCEDURE — 45380 COLONOSCOPY AND BIOPSY: CPT | Performed by: INTERNAL MEDICINE

## 2018-09-18 RX ORDER — SODIUM CHLORIDE, SODIUM LACTATE, POTASSIUM CHLORIDE, CALCIUM CHLORIDE 600; 310; 30; 20 MG/100ML; MG/100ML; MG/100ML; MG/100ML
INJECTION, SOLUTION INTRAVENOUS CONTINUOUS
Status: DISCONTINUED | OUTPATIENT
Start: 2018-09-18 | End: 2018-09-19 | Stop reason: HOSPADM

## 2018-09-18 RX ORDER — OMEPRAZOLE 20 MG/1
20 CAPSULE, DELAYED RELEASE ORAL DAILY
Qty: 30 CAPSULE | Refills: 3 | Status: SHIPPED | OUTPATIENT
Start: 2018-09-18 | End: 2019-01-23 | Stop reason: SDUPTHER

## 2018-09-18 RX ADMIN — SODIUM CHLORIDE, SODIUM LACTATE, POTASSIUM CHLORIDE, CALCIUM CHLORIDE: 600; 310; 30; 20 INJECTION, SOLUTION INTRAVENOUS at 09:13

## 2018-09-18 ASSESSMENT — PAIN - FUNCTIONAL ASSESSMENT: PAIN_FUNCTIONAL_ASSESSMENT: 0-10

## 2018-09-18 ASSESSMENT — PAIN SCALES - GENERAL
PAINLEVEL_OUTOF10: 0
PAINLEVEL_OUTOF10: 0

## 2018-09-18 NOTE — H&P
Reason for Visit: abd pain and altered bowel habit      HPI:The patient came here for both EGD and colonoscopy. There is no contraindication for both procedures. PMH:    Past Medical History:   Diagnosis Date    Arthritis     Chronic back pain     no specific injury    Chronic migraine w/o aura, not intractable, w stat migr     History of kidney stones     \"had kidney stones age 29- passed it\"    Hypertension     TMJ (dislocation of temporomandibular joint)        PSH:    Past Surgical History:   Procedure Laterality Date     SECTION  2006, ( with BPS)       Medications:    Current Outpatient Prescriptions   Medication Sig Dispense Refill    SUMAtriptan (IMITREX) 50 MG tablet Take 100 mg by mouth as needed       tiZANidine (ZANAFLEX) 4 MG tablet Take 4 mg by mouth nightly       topiramate (TOPAMAX) 100 MG tablet Take 100 mg by mouth 2 times daily      propranolol (INDERAL LA) 120 MG extended release capsule Take 160 mg by mouth daily      gabapentin (NEURONTIN) 600 MG tablet Take 800 mg by mouth 3 times daily. No current facility-administered medications for this encounter. Allergies: No Known Allergies    Social History:    Social History     Social History    Marital status:      Spouse name: N/A    Number of children: N/A    Years of education: N/A     Occupational History    Not on file.      Social History Main Topics    Smoking status: Never Smoker    Smokeless tobacco: Never Used    Alcohol use No    Drug use: No    Sexual activity: Yes     Partners: Male      Comment: stable relationship for 11 years,  3/25/2017     Other Topics Concern    Not on file     Social History Narrative    No narrative on file       Family History:    Family History   Problem Relation Age of Onset    High Blood Pressure Mother     No Known Problems Sister     Breast Cancer Maternal Aunt     Cancer Maternal Grandmother     Diabetes Maternal Grandmother

## 2018-09-19 ENCOUNTER — TELEPHONE (OUTPATIENT)
Dept: GASTROENTEROLOGY | Age: 33
End: 2018-09-19

## 2018-09-24 ENCOUNTER — HOSPITAL ENCOUNTER (EMERGENCY)
Age: 33
Discharge: HOME | End: 2018-09-24
Attending: EMERGENCY MEDICINE
Payer: COMMERCIAL

## 2018-09-24 ENCOUNTER — APPOINTMENT (OUTPATIENT)
Dept: GENERAL RADIOLOGY | Age: 33
End: 2018-09-24
Payer: COMMERCIAL

## 2018-09-24 VITALS
SYSTOLIC BLOOD PRESSURE: 122 MMHG | BODY MASS INDEX: 35.93 KG/M2 | HEIGHT: 60 IN | TEMPERATURE: 98.9 F | WEIGHT: 183 LBS | DIASTOLIC BLOOD PRESSURE: 80 MMHG | HEART RATE: 80 BPM | RESPIRATION RATE: 16 BRPM | OXYGEN SATURATION: 99 %

## 2018-09-24 DIAGNOSIS — R10.9 POSTOPERATIVE ABDOMINAL PAIN: Primary | ICD-10-CM

## 2018-09-24 DIAGNOSIS — G89.18 POSTOPERATIVE ABDOMINAL PAIN: Primary | ICD-10-CM

## 2018-09-24 LAB
ALBUMIN SERPL-MCNC: 3.8 GM/DL (ref 3.4–5)
ALP BLD-CCNC: 81 IU/L (ref 40–129)
ALT SERPL-CCNC: 25 U/L (ref 10–40)
ANION GAP SERPL CALCULATED.3IONS-SCNC: 12 MMOL/L (ref 4–16)
AST SERPL-CCNC: 17 IU/L (ref 15–37)
BASOPHILS ABSOLUTE: 0.1 K/CU MM
BASOPHILS RELATIVE PERCENT: 0.4 % (ref 0–1)
BILIRUB SERPL-MCNC: 1.1 MG/DL (ref 0–1)
BUN BLDV-MCNC: 11 MG/DL (ref 6–23)
CALCIUM SERPL-MCNC: 9 MG/DL (ref 8.3–10.6)
CHLORIDE BLD-SCNC: 101 MMOL/L (ref 99–110)
CO2: 22 MMOL/L (ref 21–32)
CREAT SERPL-MCNC: 0.8 MG/DL (ref 0.6–1.1)
DIFFERENTIAL TYPE: ABNORMAL
EOSINOPHILS ABSOLUTE: 0.2 K/CU MM
EOSINOPHILS RELATIVE PERCENT: 1.3 % (ref 0–3)
GFR AFRICAN AMERICAN: >60 ML/MIN/1.73M2
GFR NON-AFRICAN AMERICAN: >60 ML/MIN/1.73M2
GLUCOSE BLD-MCNC: 98 MG/DL (ref 70–99)
HCT VFR BLD CALC: 40.6 % (ref 37–47)
HEMOGLOBIN: 13.7 GM/DL (ref 12.5–16)
IMMATURE NEUTROPHIL %: 0.4 % (ref 0–0.43)
LYMPHOCYTES ABSOLUTE: 2.1 K/CU MM
LYMPHOCYTES RELATIVE PERCENT: 14.6 % (ref 24–44)
MCH RBC QN AUTO: 31 PG (ref 27–31)
MCHC RBC AUTO-ENTMCNC: 33.7 % (ref 32–36)
MCV RBC AUTO: 91.9 FL (ref 78–100)
MONOCYTES ABSOLUTE: 0.9 K/CU MM
MONOCYTES RELATIVE PERCENT: 6.5 % (ref 0–4)
NUCLEATED RBC %: 0 %
PDW BLD-RTO: 12.6 % (ref 11.7–14.9)
PLATELET # BLD: 283 K/CU MM (ref 140–440)
PMV BLD AUTO: 10 FL (ref 7.5–11.1)
POTASSIUM SERPL-SCNC: 4.2 MMOL/L (ref 3.5–5.1)
RBC # BLD: 4.42 M/CU MM (ref 4.2–5.4)
SEGMENTED NEUTROPHILS ABSOLUTE COUNT: 10.8 K/CU MM
SEGMENTED NEUTROPHILS RELATIVE PERCENT: 76.8 % (ref 36–66)
SODIUM BLD-SCNC: 135 MMOL/L (ref 135–145)
TOTAL IMMATURE NEUTOROPHIL: 0.06 K/CU MM
TOTAL NUCLEATED RBC: 0 K/CU MM
TOTAL PROTEIN: 7.4 GM/DL (ref 6.4–8.2)
WBC # BLD: 14.1 K/CU MM (ref 4–10.5)

## 2018-09-24 PROCEDURE — 6370000000 HC RX 637 (ALT 250 FOR IP): Performed by: EMERGENCY MEDICINE

## 2018-09-24 PROCEDURE — 85025 COMPLETE CBC W/AUTO DIFF WBC: CPT

## 2018-09-24 PROCEDURE — 80053 COMPREHEN METABOLIC PANEL: CPT

## 2018-09-24 PROCEDURE — 36415 COLL VENOUS BLD VENIPUNCTURE: CPT

## 2018-09-24 PROCEDURE — 74018 RADEX ABDOMEN 1 VIEW: CPT

## 2018-09-24 PROCEDURE — 99283 EMERGENCY DEPT VISIT LOW MDM: CPT

## 2018-09-24 RX ORDER — HYDROCODONE BITARTRATE AND ACETAMINOPHEN 5; 325 MG/1; MG/1
1 TABLET ORAL ONCE
Status: COMPLETED | OUTPATIENT
Start: 2018-09-24 | End: 2018-09-24

## 2018-09-24 RX ADMIN — HYDROCODONE BITARTRATE AND ACETAMINOPHEN 1 TABLET: 5; 325 TABLET ORAL at 11:31

## 2018-09-24 ASSESSMENT — PAIN DESCRIPTION - FREQUENCY: FREQUENCY: CONTINUOUS

## 2018-09-24 ASSESSMENT — PAIN DESCRIPTION - DESCRIPTORS: DESCRIPTORS: ACHING

## 2018-09-24 ASSESSMENT — PAIN SCALES - GENERAL
PAINLEVEL_OUTOF10: 10
PAINLEVEL_OUTOF10: 5
PAINLEVEL_OUTOF10: 10

## 2018-09-24 ASSESSMENT — PAIN DESCRIPTION - LOCATION: LOCATION: UMBILICUS

## 2018-09-24 ASSESSMENT — PAIN DESCRIPTION - PAIN TYPE: TYPE: ACUTE PAIN

## 2018-09-24 NOTE — ED PROVIDER NOTES
Triage Chief Complaint:   Post-op Problem      HPI:  Prieto Lopez is a 34 y.o. female that presents with abdominal pain. She states that last week she had a hernia repair done by Dr. Hiral Reyes. She reports that since then she's been having pain around the incision site, states that anything that touches it causes it to worsen. She reports constipation, has not had a bowel movement since the day before surgery. States that the day before surgery she had colonoscopy and endoscopy. She is passing gas. Denies vomiting but has felt nauseated. She states she's felt feverish but has not recorded a fever at home. ROS:  General:  + fevers, + chills  Eyes:  No recent vison changes  ENT:  No sore throat, no nasal congestion  Cardiovascular:  No chest pain, no palpitations  Respiratory:  No shortness of breath, no cough  Gastrointestinal:  + pain, + nausea, no vomiting, no diarrhea  Musculoskeletal:  No muscle pain, no joint pain  Skin:  No rash, no pruritis, no easy bruising  Neurologic:  No headache or weakness  Genitourinary:  No dysuria, no hematuria  Extremities:  no edema, no pain    Past Medical History:   Diagnosis Date    Arthritis     Chronic back pain     no specific injury    Chronic migraine w/o aura, not intractable, w stat migr     History of kidney stones     \"had kidney stones age 29- passed it\"    Hypertension     TMJ (dislocation of temporomandibular joint)      Past Surgical History:   Procedure Laterality Date     SECTION  2006, ( with BPS)    COLONOSCOPY  2018    internal grade 1 hemorrhoids    ENDOSCOPY, COLON, DIAGNOSTIC  2018    multiple biopsiesto rule out h pylori and sprue, mild erythematous found in antrum.     HERNIA REPAIR  2018    Repair incarcerated epigastric hernia     Family History   Problem Relation Age of Onset    High Blood Pressure Mother     No Known Problems Sister     Breast Cancer Maternal Aunt     Cancer Maternal

## 2018-09-27 ENCOUNTER — OFFICE VISIT (OUTPATIENT)
Dept: SURGERY | Age: 33
End: 2018-09-27

## 2018-09-27 VITALS
BODY MASS INDEX: 35.93 KG/M2 | HEART RATE: 90 BPM | HEIGHT: 60 IN | WEIGHT: 183 LBS | DIASTOLIC BLOOD PRESSURE: 88 MMHG | OXYGEN SATURATION: 98 % | SYSTOLIC BLOOD PRESSURE: 138 MMHG | RESPIRATION RATE: 12 BRPM

## 2018-09-27 DIAGNOSIS — K43.6 INCARCERATED EPIGASTRIC HERNIA: Primary | ICD-10-CM

## 2018-09-27 DIAGNOSIS — Z09 POSTOP CHECK: ICD-10-CM

## 2018-09-27 PROCEDURE — 99024 POSTOP FOLLOW-UP VISIT: CPT | Performed by: SURGERY

## 2018-09-27 NOTE — PROGRESS NOTES
Mireya Hernandez has a pain level on 0/10 scale  5    Location abdomin    Description dull    Radiation   No    Duration  8 day(s)    Time  intermittent

## 2018-09-27 NOTE — PROGRESS NOTES
Crystal is 1 week post-op: epigastric hernia. Presenting for routine follow-up. S:  Doing well. Patient has noted no excessive redness and swelling. O:  Wound healing well. Incision clean and dry. No hernia noted     A:  Satisfactory course. P: Sutures removed. Patient to return in 2 weeks. Patient is to return as needed for redness, swelling, discomfort, or any concern about her  surgery.

## 2018-10-03 ENCOUNTER — OFFICE VISIT (OUTPATIENT)
Dept: GASTROENTEROLOGY | Age: 33
End: 2018-10-03
Payer: COMMERCIAL

## 2018-10-03 VITALS
BODY MASS INDEX: 35.69 KG/M2 | OXYGEN SATURATION: 98 % | WEIGHT: 181.8 LBS | DIASTOLIC BLOOD PRESSURE: 80 MMHG | HEART RATE: 75 BPM | SYSTOLIC BLOOD PRESSURE: 122 MMHG | HEIGHT: 60 IN

## 2018-10-03 DIAGNOSIS — K21.9 GASTROESOPHAGEAL REFLUX DISEASE WITHOUT ESOPHAGITIS: ICD-10-CM

## 2018-10-03 DIAGNOSIS — K59.00 CONSTIPATION, UNSPECIFIED CONSTIPATION TYPE: ICD-10-CM

## 2018-10-03 DIAGNOSIS — K29.70 GASTRITIS WITHOUT BLEEDING, UNSPECIFIED CHRONICITY, UNSPECIFIED GASTRITIS TYPE: Primary | ICD-10-CM

## 2018-10-03 PROCEDURE — G8484 FLU IMMUNIZE NO ADMIN: HCPCS | Performed by: NURSE PRACTITIONER

## 2018-10-03 PROCEDURE — G8417 CALC BMI ABV UP PARAM F/U: HCPCS | Performed by: NURSE PRACTITIONER

## 2018-10-03 PROCEDURE — 1036F TOBACCO NON-USER: CPT | Performed by: NURSE PRACTITIONER

## 2018-10-03 PROCEDURE — G8427 DOCREV CUR MEDS BY ELIG CLIN: HCPCS | Performed by: NURSE PRACTITIONER

## 2018-10-03 PROCEDURE — 99214 OFFICE O/P EST MOD 30 MIN: CPT | Performed by: NURSE PRACTITIONER

## 2018-10-03 ASSESSMENT — ENCOUNTER SYMPTOMS
VOMITING: 0
BLOOD IN STOOL: 0
DOUBLE VISION: 0
HEARTBURN: 0
WHEEZING: 0
CONSTIPATION: 1
DIARRHEA: 0
BLURRED VISION: 0
COUGH: 0
EYE PAIN: 0
NAUSEA: 0
BACK PAIN: 0
ABDOMINAL PAIN: 1
HEMOPTYSIS: 0
SPUTUM PRODUCTION: 0
SHORTNESS OF BREATH: 0

## 2018-10-03 NOTE — PATIENT INSTRUCTIONS
for over 3 months;  · frequent chest pain, heartburn with wheezing;  · unexplained weight loss; or  · nausea or vomiting, stomach pain. Taking a proton pump inhibitor such as omeprazole may increase your risk of bone fracture in the hip, wrist, or spine. This effect has occurred mostly in people who have taken the medication long term or at high doses, and in those who are age 48 and older. It is not clear whether omeprazole is the actual cause of an increased risk of fracture. Some conditions are treated with a combination of omeprazole and antibiotics. Use all medications as directed by your doctor. Read the medication guide or patient instructions provided with each medication. Do not change your doses or medication schedule without your doctor's advice. It is not known whether this medicine will harm an unborn baby. Tell your doctor if you are pregnant or plan to become pregnant. Omeprazole can pass into breast milk and may harm a nursing baby. Do not use this medicine without a doctor's advice if you are breast-feeding. Do not give omeprazole to a child younger than 3year old without the advice of a doctor. How should I take omeprazole? Omeprazole is usually taken before eating (at least 1 hour before a meal). Follow all directions on your prescription label. Do not take this medicine in larger or smaller amounts or for longer than recommended. Prilosec OTC (over-the-counter) should be taken only once every 24 hours for 14 days. Take the medicine in the morning before you eat breakfast. It may take up to 4 days for full effect. Do not take more than one tablet every 24 hours. Allow at least 4 months to pass before you start another 14-day treatment with Prilosec OTC. Call your doctor if you have additional symptoms and need treatment before the 4 months has passed. Do not crush, chew, or break an enteric coated pill, or a Prilosec OTC tablet. Swallow the pill whole.   You may open the delayed-release least 30 minutes of exercise on most days of the week. Walking is a good choice. You also may want to do other activities, such as running, swimming, cycling, or playing tennis or team sports. · Take a fiber supplement, such as Citrucel or Metamucil, every day. Read and follow all instructions on the label. · Schedule time each day for a bowel movement. A daily routine may help. Take your time having your bowel movement. · Support your feet with a small step stool when you sit on the toilet. This helps flex your hips and places your pelvis in a squatting position. · Your doctor may recommend an over-the-counter laxative to relieve your constipation. Examples are Milk of Magnesia and MiraLax. Read and follow all instructions on the label. Do not use laxatives on a long-term basis. When should you call for help? Call your doctor now or seek immediate medical care if:    · You have new or worse belly pain.     · You have new or worse nausea or vomiting.     · You have blood in your stools.    Watch closely for changes in your health, and be sure to contact your doctor if:    · Your constipation is getting worse.     · You do not get better as expected. Where can you learn more? Go to https://[employer]. org and sign in to your [employer] account. Enter 21 [phone] in the [facility] box to learn more about \"Constipation: Care Instructions. \"     If you do not have an account, please click on the \"Sign Up Now\" link. Current as of: November 20, 2017  Content Version: 11.7  © 9651-4368 [employer]. Care instructions adapted under license by [facility] ([facility]). If you have questions about a medical condition or this instruction, always ask your healthcare professional. [place] [address] any warranty or liability for your use of this information.        Patient Education        High-Fiber Diet: Care Instructions  Your Care Instructions    A high-fiber diet may help you

## 2018-10-11 ENCOUNTER — OFFICE VISIT (OUTPATIENT)
Dept: SURGERY | Age: 33
End: 2018-10-11

## 2018-10-11 VITALS — SYSTOLIC BLOOD PRESSURE: 140 MMHG | OXYGEN SATURATION: 98 % | DIASTOLIC BLOOD PRESSURE: 100 MMHG | HEART RATE: 68 BPM

## 2018-10-11 DIAGNOSIS — K43.6 INCARCERATED EPIGASTRIC HERNIA: Primary | ICD-10-CM

## 2018-10-11 DIAGNOSIS — Z09 POSTOP CHECK: ICD-10-CM

## 2018-10-11 PROCEDURE — 99024 POSTOP FOLLOW-UP VISIT: CPT | Performed by: SURGERY

## 2018-10-11 NOTE — PROGRESS NOTES
Crystal is 3 weeks post-op: epigastric hernia repair. Presenting for routine follow-up. S:  Doing well. Patient has noted no excessive redness and swelling. O:  Wound healing well. No hernia noted    A:  Satisfactory course. P: Patient to return prn. Patient is to return as needed for redness, swelling, discomfort, or any concern about her  surgery.

## 2018-12-04 ENCOUNTER — HOSPITAL ENCOUNTER (OUTPATIENT)
Age: 33
Discharge: HOME | End: 2018-12-04
Payer: COMMERCIAL

## 2018-12-04 LAB
ALBUMIN SERPL-MCNC: 4.2 GM/DL (ref 3.4–5)
ALBUMIN SERPL-MCNC: 4.2 GM/DL (ref 3.4–5)
ALP BLD-CCNC: 83 IU/L (ref 40–128)
ALP BLD-CCNC: 83 IU/L (ref 40–129)
ALT SERPL-CCNC: 10 U/L (ref 10–40)
ALT SERPL-CCNC: 10 U/L (ref 10–40)
ANION GAP SERPL CALCULATED.3IONS-SCNC: 13 MMOL/L (ref 4–16)
AST SERPL-CCNC: 14 IU/L (ref 15–37)
AST SERPL-CCNC: 14 IU/L (ref 15–37)
BACTERIA: NEGATIVE /HPF
BASOPHILS ABSOLUTE: 0.1 K/CU MM
BASOPHILS RELATIVE PERCENT: 0.7 % (ref 0–1)
BILIRUB SERPL-MCNC: 0.6 MG/DL (ref 0–1)
BILIRUB SERPL-MCNC: 0.6 MG/DL (ref 0–1)
BILIRUBIN DIRECT: 0.2 MG/DL (ref 0–0.3)
BILIRUBIN URINE: NEGATIVE MG/DL
BILIRUBIN, INDIRECT: 0.4 MG/DL (ref 0–0.7)
BLOOD, URINE: ABNORMAL
BUN BLDV-MCNC: 9 MG/DL (ref 6–23)
CALCIUM SERPL-MCNC: 9.1 MG/DL (ref 8.3–10.6)
CHLORIDE BLD-SCNC: 105 MMOL/L (ref 99–110)
CHOLESTEROL: 166 MG/DL
CLARITY: CLEAR
CO2: 23 MMOL/L (ref 21–32)
COLOR: YELLOW
CREAT SERPL-MCNC: 0.8 MG/DL (ref 0.6–1.1)
DIFFERENTIAL TYPE: ABNORMAL
EOSINOPHILS ABSOLUTE: 0.2 K/CU MM
EOSINOPHILS RELATIVE PERCENT: 3 % (ref 0–3)
ERYTHROCYTE SEDIMENTATION RATE: 22 MM/HR (ref 0–20)
ESTIMATED AVERAGE GLUCOSE: 94 MG/DL
FOLATE: 4.8 NG/ML (ref 3.1–17.5)
GFR AFRICAN AMERICAN: >60 ML/MIN/1.73M2
GFR NON-AFRICAN AMERICAN: >60 ML/MIN/1.73M2
GLUCOSE BLD-MCNC: 91 MG/DL (ref 70–99)
GLUCOSE, URINE: NEGATIVE MG/DL
HBA1C MFR BLD: 4.9 % (ref 4.2–6.3)
HCT VFR BLD CALC: 41.2 % (ref 37–47)
HDLC SERPL-MCNC: 49 MG/DL
HEMOGLOBIN: 13 GM/DL (ref 12.5–16)
IMMATURE NEUTROPHIL %: 0.3 % (ref 0–0.43)
IRON: 30 UG/DL (ref 37–145)
KETONES, URINE: NEGATIVE MG/DL
LDL CHOLESTEROL DIRECT: 110 MG/DL
LEUKOCYTE ESTERASE, URINE: NEGATIVE
LYMPHOCYTES ABSOLUTE: 3 K/CU MM
LYMPHOCYTES RELATIVE PERCENT: 40.7 % (ref 24–44)
MAGNESIUM: 2.2 MG/DL (ref 1.8–2.4)
MCH RBC QN AUTO: 29.9 PG (ref 27–31)
MCHC RBC AUTO-ENTMCNC: 31.6 % (ref 32–36)
MCV RBC AUTO: 94.7 FL (ref 78–100)
MONOCYTES ABSOLUTE: 0.5 K/CU MM
MONOCYTES RELATIVE PERCENT: 7.2 % (ref 0–4)
MUCUS: ABNORMAL HPF
NITRITE URINE, QUANTITATIVE: NEGATIVE
NUCLEATED RBC %: 0 %
PCT TRANSFERRIN: 11 % (ref 10–44)
PDW BLD-RTO: 12.9 % (ref 11.7–14.9)
PH, URINE: 5 (ref 5–8)
PLATELET # BLD: 331 K/CU MM (ref 140–440)
PMV BLD AUTO: 10.1 FL (ref 7.5–11.1)
POTASSIUM SERPL-SCNC: 4 MMOL/L (ref 3.5–5.1)
PROTEIN UA: NEGATIVE MG/DL
RBC # BLD: 4.35 M/CU MM (ref 4.2–5.4)
RBC URINE: 2 /HPF (ref 0–6)
SEGMENTED NEUTROPHILS ABSOLUTE COUNT: 3.5 K/CU MM
SEGMENTED NEUTROPHILS RELATIVE PERCENT: 48.1 % (ref 36–66)
SODIUM BLD-SCNC: 141 MMOL/L (ref 135–145)
SPECIFIC GRAVITY UA: 1.03 (ref 1–1.03)
SQUAMOUS EPITHELIAL: 3 /HPF
T4 FREE: 1.41 NG/DL (ref 0.9–1.8)
TOTAL IMMATURE NEUTOROPHIL: 0.02 K/CU MM
TOTAL IRON BINDING CAPACITY: 268 UG/DL (ref 250–450)
TOTAL NUCLEATED RBC: 0 K/CU MM
TOTAL PROTEIN: 7.2 GM/DL (ref 6.4–8.2)
TOTAL PROTEIN: 7.2 GM/DL (ref 6.4–8.2)
TOTAL RETICULOCYTE COUNT: 0.1 K/CU MM
TRICHOMONAS: ABNORMAL /HPF
TRIGL SERPL-MCNC: 62 MG/DL
TSH HIGH SENSITIVITY: 1.1 UIU/ML (ref 0.27–4.2)
UNSATURATED IRON BINDING CAPACITY: 238 UG/DL (ref 110–370)
URIC ACID: 4.3 MG/DL (ref 2.6–6)
UROBILINOGEN, URINE: NORMAL MG/DL (ref 0.2–1)
VITAMIN B-12: 659.3 PG/ML (ref 211–911)
VITAMIN D 25-HYDROXY: 11.9 NG/ML
WBC # BLD: 7.3 K/CU MM (ref 4–10.5)
WBC UA: 1 /HPF (ref 0–5)

## 2018-12-04 PROCEDURE — 82306 VITAMIN D 25 HYDROXY: CPT

## 2018-12-04 PROCEDURE — 85652 RBC SED RATE AUTOMATED: CPT

## 2018-12-04 PROCEDURE — 86038 ANTINUCLEAR ANTIBODIES: CPT

## 2018-12-04 PROCEDURE — 86430 RHEUMATOID FACTOR TEST QUAL: CPT

## 2018-12-04 PROCEDURE — 84550 ASSAY OF BLOOD/URIC ACID: CPT

## 2018-12-04 PROCEDURE — 83036 HEMOGLOBIN GLYCOSYLATED A1C: CPT

## 2018-12-04 PROCEDURE — 36415 COLL VENOUS BLD VENIPUNCTURE: CPT

## 2018-12-04 PROCEDURE — 83721 ASSAY OF BLOOD LIPOPROTEIN: CPT

## 2018-12-04 PROCEDURE — 83540 ASSAY OF IRON: CPT

## 2018-12-04 PROCEDURE — 85025 COMPLETE CBC W/AUTO DIFF WBC: CPT

## 2018-12-04 PROCEDURE — 82746 ASSAY OF FOLIC ACID SERUM: CPT

## 2018-12-04 PROCEDURE — 83735 ASSAY OF MAGNESIUM: CPT

## 2018-12-04 PROCEDURE — 82248 BILIRUBIN DIRECT: CPT

## 2018-12-04 PROCEDURE — 81001 URINALYSIS AUTO W/SCOPE: CPT

## 2018-12-04 PROCEDURE — 80061 LIPID PANEL: CPT

## 2018-12-04 PROCEDURE — 84439 ASSAY OF FREE THYROXINE: CPT

## 2018-12-04 PROCEDURE — 82607 VITAMIN B-12: CPT

## 2018-12-04 PROCEDURE — 84443 ASSAY THYROID STIM HORMONE: CPT

## 2018-12-04 PROCEDURE — 80053 COMPREHEN METABOLIC PANEL: CPT

## 2018-12-04 PROCEDURE — 83550 IRON BINDING TEST: CPT

## 2018-12-06 LAB — RHEUMATOID FACTOR: <10

## 2018-12-07 LAB — ANTI-NUCLEAR ANTIBODY (ANA): NORMAL

## 2019-01-23 ENCOUNTER — OFFICE VISIT (OUTPATIENT)
Dept: GASTROENTEROLOGY | Age: 34
End: 2019-01-23
Payer: COMMERCIAL

## 2019-01-23 VITALS
BODY MASS INDEX: 34.91 KG/M2 | DIASTOLIC BLOOD PRESSURE: 80 MMHG | HEART RATE: 82 BPM | HEIGHT: 60 IN | WEIGHT: 177.8 LBS | OXYGEN SATURATION: 99 % | SYSTOLIC BLOOD PRESSURE: 122 MMHG

## 2019-01-23 DIAGNOSIS — K21.9 GASTROESOPHAGEAL REFLUX DISEASE WITHOUT ESOPHAGITIS: Primary | ICD-10-CM

## 2019-01-23 DIAGNOSIS — K59.04 CHRONIC IDIOPATHIC CONSTIPATION: ICD-10-CM

## 2019-01-23 PROCEDURE — G8484 FLU IMMUNIZE NO ADMIN: HCPCS | Performed by: NURSE PRACTITIONER

## 2019-01-23 PROCEDURE — G8427 DOCREV CUR MEDS BY ELIG CLIN: HCPCS | Performed by: NURSE PRACTITIONER

## 2019-01-23 PROCEDURE — 1036F TOBACCO NON-USER: CPT | Performed by: NURSE PRACTITIONER

## 2019-01-23 PROCEDURE — G8417 CALC BMI ABV UP PARAM F/U: HCPCS | Performed by: NURSE PRACTITIONER

## 2019-01-23 PROCEDURE — 99214 OFFICE O/P EST MOD 30 MIN: CPT | Performed by: NURSE PRACTITIONER

## 2019-01-23 RX ORDER — OMEPRAZOLE 20 MG/1
20 CAPSULE, DELAYED RELEASE ORAL DAILY
Qty: 30 CAPSULE | Refills: 5 | Status: SHIPPED | OUTPATIENT
Start: 2019-01-23 | End: 2019-04-30 | Stop reason: SDUPTHER

## 2019-01-23 RX ORDER — DOCUSATE SODIUM 100 MG/1
100 CAPSULE, LIQUID FILLED ORAL 2 TIMES DAILY
Qty: 60 CAPSULE | Refills: 0 | Status: SHIPPED | OUTPATIENT
Start: 2019-01-23 | End: 2019-02-22

## 2019-01-23 RX ORDER — POLYETHYLENE GLYCOL 3350 17 G/17G
17 POWDER, FOR SOLUTION ORAL 2 TIMES DAILY
Qty: 1020 G | Refills: 1 | Status: SHIPPED | OUTPATIENT
Start: 2019-01-23 | End: 2019-05-23

## 2019-01-23 ASSESSMENT — ENCOUNTER SYMPTOMS
CONSTIPATION: 1
EYE PAIN: 0
ABDOMINAL PAIN: 0
SHORTNESS OF BREATH: 0
NAUSEA: 0
PHOTOPHOBIA: 0
VOMITING: 0
COLOR CHANGE: 0
COUGH: 0
BLOOD IN STOOL: 0
BACK PAIN: 0
DIARRHEA: 0

## 2019-02-07 ENCOUNTER — TELEPHONE (OUTPATIENT)
Dept: GASTROENTEROLOGY | Age: 34
End: 2019-02-07

## 2019-02-18 ENCOUNTER — TELEPHONE (OUTPATIENT)
Dept: GASTROENTEROLOGY | Age: 34
End: 2019-02-18

## 2019-03-08 ENCOUNTER — HOSPITAL ENCOUNTER (OUTPATIENT)
Age: 34
Discharge: HOME | End: 2019-03-08
Payer: COMMERCIAL

## 2019-03-08 LAB
ALBUMIN SERPL-MCNC: 4.2 GM/DL (ref 3.4–5)
ALP BLD-CCNC: 75 IU/L (ref 40–129)
ALT SERPL-CCNC: 8 U/L (ref 10–40)
ANION GAP SERPL CALCULATED.3IONS-SCNC: 10 MMOL/L (ref 4–16)
AST SERPL-CCNC: 13 IU/L (ref 15–37)
BACTERIA: ABNORMAL /HPF
BASOPHILS ABSOLUTE: 0.1 K/CU MM
BASOPHILS RELATIVE PERCENT: 0.6 % (ref 0–1)
BILIRUB SERPL-MCNC: 1.4 MG/DL (ref 0–1)
BILIRUBIN URINE: NEGATIVE MG/DL
BLOOD, URINE: ABNORMAL
BUN BLDV-MCNC: 10 MG/DL (ref 6–23)
CALCIUM SERPL-MCNC: 8.8 MG/DL (ref 8.3–10.6)
CHLORIDE BLD-SCNC: 106 MMOL/L (ref 99–110)
CLARITY: ABNORMAL
CO2: 26 MMOL/L (ref 21–32)
COLOR: YELLOW
CREAT SERPL-MCNC: 0.8 MG/DL (ref 0.6–1.1)
DIFFERENTIAL TYPE: ABNORMAL
EOSINOPHILS ABSOLUTE: 0.2 K/CU MM
EOSINOPHILS RELATIVE PERCENT: 1.8 % (ref 0–3)
FERRITIN: 30 NG/ML (ref 15–150)
GFR AFRICAN AMERICAN: >60 ML/MIN/1.73M2
GFR NON-AFRICAN AMERICAN: >60 ML/MIN/1.73M2
GLUCOSE FASTING: 93 MG/DL (ref 70–99)
GLUCOSE, URINE: NEGATIVE MG/DL
HCT VFR BLD CALC: 41 % (ref 37–47)
HEMOGLOBIN: 13.1 GM/DL (ref 12.5–16)
IMMATURE NEUTROPHIL %: 0.3 % (ref 0–0.43)
IRON: 110 UG/DL (ref 37–145)
KETONES, URINE: NEGATIVE MG/DL
LEUKOCYTE ESTERASE, URINE: ABNORMAL
LYMPHOCYTES ABSOLUTE: 2 K/CU MM
LYMPHOCYTES RELATIVE PERCENT: 23.1 % (ref 24–44)
MCH RBC QN AUTO: 30.7 PG (ref 27–31)
MCHC RBC AUTO-ENTMCNC: 32 % (ref 32–36)
MCV RBC AUTO: 96 FL (ref 78–100)
MONOCYTES ABSOLUTE: 0.6 K/CU MM
MONOCYTES RELATIVE PERCENT: 7.2 % (ref 0–4)
NITRITE URINE, QUANTITATIVE: POSITIVE
NUCLEATED RBC %: 0 %
PCT TRANSFERRIN: 38 % (ref 10–44)
PDW BLD-RTO: 12.7 % (ref 11.7–14.9)
PH, URINE: 9 (ref 5–8)
PLATELET # BLD: 304 K/CU MM (ref 140–440)
PMV BLD AUTO: 10 FL (ref 7.5–11.1)
POTASSIUM SERPL-SCNC: 4.8 MMOL/L (ref 3.5–5.1)
PREGNANCY, URINE: NEGATIVE
PROTEIN UA: 30 MG/DL
RBC # BLD: 4.27 M/CU MM (ref 4.2–5.4)
RBC URINE: 2900 /HPF (ref 0–6)
SEGMENTED NEUTROPHILS ABSOLUTE COUNT: 5.9 K/CU MM
SEGMENTED NEUTROPHILS RELATIVE PERCENT: 67 % (ref 36–66)
SODIUM BLD-SCNC: 142 MMOL/L (ref 135–145)
SPECIFIC GRAVITY UA: 1.01 (ref 1–1.03)
SPECIFIC GRAVITY, URINE: 1.01 (ref 1–1.03)
SQUAMOUS EPITHELIAL: 2 /HPF
TOTAL IMMATURE NEUTOROPHIL: 0.03 K/CU MM
TOTAL IRON BINDING CAPACITY: 289 UG/DL (ref 250–450)
TOTAL NUCLEATED RBC: 0 K/CU MM
TOTAL PROTEIN: 6.8 GM/DL (ref 6.4–8.2)
TRICHOMONAS: ABNORMAL /HPF
UNSATURATED IRON BINDING CAPACITY: 179 UG/DL (ref 110–370)
UROBILINOGEN, URINE: NORMAL MG/DL (ref 0.2–1)
WBC # BLD: 8.9 K/CU MM (ref 4–10.5)
WBC CLUMP: ABNORMAL /HPF
WBC UA: 12 /HPF (ref 0–5)
YEAST: ABNORMAL /HPF

## 2019-03-08 PROCEDURE — 83540 ASSAY OF IRON: CPT

## 2019-03-08 PROCEDURE — 81001 URINALYSIS AUTO W/SCOPE: CPT

## 2019-03-08 PROCEDURE — 87086 URINE CULTURE/COLONY COUNT: CPT

## 2019-03-08 PROCEDURE — 83550 IRON BINDING TEST: CPT

## 2019-03-08 PROCEDURE — 80053 COMPREHEN METABOLIC PANEL: CPT

## 2019-03-08 PROCEDURE — 36415 COLL VENOUS BLD VENIPUNCTURE: CPT

## 2019-03-08 PROCEDURE — 87186 SC STD MICRODIL/AGAR DIL: CPT

## 2019-03-08 PROCEDURE — 81025 URINE PREGNANCY TEST: CPT

## 2019-03-08 PROCEDURE — 87077 CULTURE AEROBIC IDENTIFY: CPT

## 2019-03-08 PROCEDURE — 82728 ASSAY OF FERRITIN: CPT

## 2019-03-08 PROCEDURE — 85025 COMPLETE CBC W/AUTO DIFF WBC: CPT

## 2019-03-11 LAB
CULTURE: ABNORMAL
Lab: ABNORMAL
SPECIMEN: ABNORMAL
TOTAL COLONY COUNT: ABNORMAL

## 2019-04-30 ENCOUNTER — TELEPHONE (OUTPATIENT)
Dept: GASTROENTEROLOGY | Age: 34
End: 2019-04-30

## 2019-04-30 ENCOUNTER — OFFICE VISIT (OUTPATIENT)
Dept: GASTROENTEROLOGY | Age: 34
End: 2019-04-30
Payer: COMMERCIAL

## 2019-04-30 VITALS
BODY MASS INDEX: 35.11 KG/M2 | SYSTOLIC BLOOD PRESSURE: 122 MMHG | HEART RATE: 73 BPM | OXYGEN SATURATION: 98 % | WEIGHT: 179.8 LBS | DIASTOLIC BLOOD PRESSURE: 74 MMHG | RESPIRATION RATE: 16 BRPM

## 2019-04-30 DIAGNOSIS — K21.9 GASTROESOPHAGEAL REFLUX DISEASE WITHOUT ESOPHAGITIS: ICD-10-CM

## 2019-04-30 DIAGNOSIS — K59.04 CHRONIC IDIOPATHIC CONSTIPATION: Primary | ICD-10-CM

## 2019-04-30 PROCEDURE — 99214 OFFICE O/P EST MOD 30 MIN: CPT | Performed by: NURSE PRACTITIONER

## 2019-04-30 PROCEDURE — G8427 DOCREV CUR MEDS BY ELIG CLIN: HCPCS | Performed by: NURSE PRACTITIONER

## 2019-04-30 PROCEDURE — 1036F TOBACCO NON-USER: CPT | Performed by: NURSE PRACTITIONER

## 2019-04-30 PROCEDURE — G8417 CALC BMI ABV UP PARAM F/U: HCPCS | Performed by: NURSE PRACTITIONER

## 2019-04-30 RX ORDER — OMEPRAZOLE 20 MG/1
20 CAPSULE, DELAYED RELEASE ORAL DAILY
Qty: 30 CAPSULE | Refills: 5 | Status: SHIPPED | OUTPATIENT
Start: 2019-04-30 | End: 2019-07-19 | Stop reason: DRUGHIGH

## 2019-04-30 RX ORDER — OMEPRAZOLE 20 MG/1
20 CAPSULE, DELAYED RELEASE ORAL DAILY
Qty: 30 CAPSULE | Refills: 5 | Status: SHIPPED | OUTPATIENT
Start: 2019-04-30 | End: 2019-07-19

## 2019-04-30 RX ORDER — DOCUSATE SODIUM 100 MG/1
100 CAPSULE, LIQUID FILLED ORAL 2 TIMES DAILY
Qty: 60 CAPSULE | Refills: 5 | Status: SHIPPED | OUTPATIENT
Start: 2019-04-30 | End: 2019-05-30

## 2019-04-30 ASSESSMENT — ENCOUNTER SYMPTOMS
EYE PAIN: 0
COLOR CHANGE: 0
ABDOMINAL PAIN: 0
BLOOD IN STOOL: 0
DIARRHEA: 0
COUGH: 0
BACK PAIN: 0
SHORTNESS OF BREATH: 0
VOMITING: 0
CONSTIPATION: 1
NAUSEA: 0
WHEEZING: 0

## 2019-04-30 NOTE — PATIENT INSTRUCTIONS
with IBS to have symptoms. Doctors don't offer a specific diet to manage symptoms. But many people find that they feel better when they stop eating certain foods. A high-fiber diet may help if you have constipation. Follow-up care is a key part of your treatment and safety. Be sure to make and go to all appointments, and call your doctor if you are having problems. It's also a good idea to know your test results and keep a list of the medicines you take. How can you care for yourself at home? To reduce constipation  · Include fruits, vegetables, beans, and whole grains in your diet each day. These foods are high in fiber. Slowly increase the amount of fiber you eat. This helps you avoid a lot of gas. · Drink plenty of fluids, enough so that your urine is light yellow or clear like water. If you have kidney, heart, or liver disease and have to limit fluids, talk with your doctor before you increase the amount of fluids you drink. · Get some exercise every day. Build up slowly to 30 to 60 minutes a day on 5 or more days of the week. · Take a fiber supplement, such as Citrucel or Metamucil, every day if needed. Read and follow all instructions on the label. · Schedule time each day for a bowel movement. Having a daily routine may help. Take your time and do not strain when having a bowel movement. · Check with your doctor before you increase the amount of fiber in your diet. For some people who have IBS, eating more fiber may make some symptoms worse. This includes bloating. To reduce diarrhea  You may try giving up foods or drinks one at a time to see whether symptoms improve.  Limit or avoid the following:  · Alcohol  · Caffeine, which is found in coffee, tea, cola drinks, and chocolate  · Nicotine, from smoking or chewing tobacco  · Gas-producing foods, such as beans, broccoli, cabbage, and apples  · Dairy products that contain lactose (milk sugar), such as ice cream, milk, cheese, and sour cream  · Foods that you get 30 grams of fiber a day. Before you make changes to the way you eat, be sure to talk with your doctor or dietitian. Follow-up care is a key part of your treatment and safety. Be sure to make and go to all appointments, and call your doctor if you are having problems. It's also a good idea to know your test results and keep a list of the medicines you take. How can you care for yourself at home? · You can increase how much fiber you get if you eat more of certain foods. These foods include:  ? Whole-grain breads and cereals. ? Fruits, such as pears, apples, and peaches. Eat the skins, peels, and seeds, if you can.  ? Vegetables, such as broccoli, cabbage, spinach, carrots, asparagus, and squash. ? Starchy vegetables. These include potatoes with skins, kidney beans, and lima beans. · Take a fiber supplement every day if your doctor recommends it. Examples are Benefiber, Citrucel, FiberCon, and Metamucil. Ask your doctor how much to take. · Drink plenty of fluids, enough so that your urine is light yellow or clear like water. If you have kidney, heart, or liver disease and have to limit fluids, talk with your doctor before you increase the amount of fluids you drink. · Get some exercise every day. Exercise helps stool move through the colon. It also helps prevent constipation. · Keep a food diary. Try to notice and write down what foods cause gas, pain, or other symptoms. Then you can avoid these foods. Where can you learn more? Go to https://[employer]. org and sign in to your [employer] account. Enter Y375 in the [facility] box to learn more about \"High-Fiber Diet: Care Instructions. \"     If you do not have an account, please click on the \"Sign Up Now\" link. Current as of: March 28, 2018  Content Version: 11.9  © 4026-5243 [employer]. Care instructions adapted under license by [facility] ([facility]).  If you have questions about a medical condition or this instruction, always ask your healthcare professional. [place] [address] any warranty or liability for your use of this information. Patient Education        linaclotide  Pronunciation:  CHATO a KLOE tide  Brand:  Linzess  What is the most important information I should know about linaclotide? You should not use linaclotide if you have a blockage in your intestines. Linaclotide should not be given to a child younger than 10years old. Linaclotide can cause severe dehydration in a child. What is linaclotide? Linaclotide works by increasing the secretion of chloride and water in the intestines, which can soften stools and stimulate bowel movements. Linaclotide is used to treat chronic constipation, or chronic irritable bowel syndrome (IBS) in people who have had constipation as the main symptom. Linaclotide may also be used for purposes not listed in this medication guide. What should I discuss with my healthcare provider before taking linaclotide? You should not use linaclotide if you are allergic to it, or if you have:  · a blockage in your intestines. Linaclotide can cause severe dehydration in a child. Linaclotide should not be given to a child younger than 10years old. Do not give this medicine to any child or teenager without the advice of a doctor. It is not known whether linaclotide will harm an unborn baby. Tell your doctor if you are pregnant or plan to become pregnant while using this medicine. It is not known whether linaclotide passes into breast milk or if it could harm a nursing baby. Tell your doctor if you are breast-feeding a baby. How should I take linaclotide? Follow the directions on your prescription label. Do not take this medicine in larger or smaller amounts or for longer than recommended. Take linaclotide in the morning on an empty stomach, at least 30 minutes before your first meal.  Do not crush, chew, or break a linaclotide capsule. Swallow it whole.   If you cannot swallow the linaclotide capsule whole, you may open the capsule and sprinkle the medicine into a spoonful of applesauce or bottled water. Swallow the mixture right away without chewing. Do not save it for later use. Even if you have taken this medicine with applesauce, wait at least 30 minutes before eating a full meal.  If needed, medicine from the linaclotide capsule may be given through a nasogastric (NG) or gastronomy tube. Read all patient information, medication guides, and instruction sheets provided to you. Carefully follow instructions for mixing medicine from the capsule with applesauce or water, or giving the medicine through a feeding tube. Ask your doctor or pharmacist if you have any questions. It may take up to 2 weeks before your symptoms improve. Keep using the medication as directed. Call your doctor if your symptoms do not improve, or if they get worse while using linaclotide. Store at room temperature away from moisture and heat. Keep this medicine out of the reach of children. Linaclotide can cause severe diarrhea and dehydration in a child who accidentally swallows this medicine. Seek emergency medical attention if this happens. Keep the capsules in their original container, along with the packet of moisture-absorbing preservative that comes with this medicine. Keep the bottle tightly closed when not in use. What happens if I miss a dose? Skip the missed dose and take the medicine at your next regularly scheduled time. Do not take extra medicine to make up the missed dose. What happens if I overdose? Seek emergency medical attention or call the Poison Help line at 1-658.653.7859. What should I avoid while taking linaclotide? Follow your doctor's instructions about any restrictions on food, beverages, or activity. What are the possible side effects of linaclotide?   Get emergency medical help if you have any of these signs of an allergic reaction: hives; difficult breathing; swelling of your face, lips, tongue, or throat. Stop using linaclotide and call your doctor at once if you have:  · severe or ongoing diarrhea;  · diarrhea with dizziness or a light-headed feeling (like you might pass out);  · signs of an electrolyte imbalance --increased thirst or urination, leg cramps, mood changes, confusion, feeling unsteady, irregular heartbeats, fluttering in your chest, muscle weakness or limp feeling;  · severe stomach pain; or  · black, bloody, or tarry stools. Common side effects may include:  · diarrhea;  · stomach pain;  · gas; or  · bloating or full feeling in your stomach. This is not a complete list of side effects and others may occur. Call your doctor for medical advice about side effects. You may report side effects to FDA at 9-714-FDA-8293. What other drugs will affect linaclotide? Other drugs may interact with linaclotide, including prescription, over-the-counter, vitamin, and herbal products. Tell your doctor about all medications you use, and those you start or stop using during your treatment with linaclotide. Where can I get more information? Your pharmacist can provide more information about linaclotide. Remember, keep this and all other medicines out of the reach of children, never share your medicines with others, and use this medication only for the indication prescribed. Every effort has been made to ensure that the information provided by [address] is accurate, up-to-date, and complete, but no guarantee is made to that effect. Drug information contained herein may be time sensitive. [facility] information has been compiled for use by healthcare practitioners and consumers in the [place] and therefore [employer] does not warrant that uses outside of the [place] are appropriate, unless specifically indicated otherwise. [facility] drug information does not endorse drugs, diagnose patients or recommend therapy.  [facility] drug information is an informational resource designed to assist licensed healthcare practitioners in caring for their patients and/or to serve consumers viewing this service as a supplement to, and not a substitute for, the expertise, skill, knowledge and judgment of healthcare practitioners. The absence of a warning for a given drug or drug combination in no way should be construed to indicate that the drug or drug combination is safe, effective or appropriate for any given patient. [facility] does not assume any responsibility for any aspect of healthcare administered with the aid of information [facility] provides. The information contained herein is not intended to cover all possible uses, directions, precautions, warnings, drug interactions, allergic reactions, or adverse effects. If you have questions about the drugs you are taking, check with your doctor, nurse or pharmacist.  Copyright 0429-7151 [address]. Version: 4.02. Revision date: 9/19/2017. Care instructions adapted under license by [facility] ([facility]). If you have questions about a medical condition or this instruction, always ask your healthcare professional. [place] [address] any warranty or liability for your use of this information. Patient Education        docusate (oral/rectal)  Pronunciation:  DOK ue sate  Brand:  Colace, Diocto, Dioeze, Doc-Q-Lace, Docu, Docu Soft, Doculase, Docuprene, Docusil, Docusoft S, DocuSol, DOK, DSS, Dulcolax Stool Softener, Enemeez Mini, Wilmer-Tin, Octycine-250, Pedia-Lax Stool Softener, [employer] Softener, Promolaxin, Silace, Surfak Stool Softener, Gela-Q-Lax, Vacuant  What is the most important information I should know about docusate? You should not use docusate if you have a blockage in your intestines. Do not use docusate while you are sick with nausea, vomiting, or severe stomach pain. You should not take mineral oil while using docusate. What is docusate? Docusate is a stool softener.  It makes bowel movements softer and easier to pass. Docusate is used to treat or prevent constipation, and to reduce pain or rectal damage caused by hard stools or by straining during bowel movements. Docusate may also be used for purposes not listed in this medication guide. What should I discuss with my healthcare provider before using docusate? You should not use docusate if you are allergic to it, or if you have:  · nausea, vomiting, or severe stomach pain;  · a blockage in your intestines; or  · chronic stomach pain that has not been checked by a doctor. You should not take mineral oil while using docusate. Ask a doctor or pharmacist if it is safe for you to take docusate:  · if you are on a low-salt diet; or  · if you have recently had a sudden change in your bowel habits lasting for longer than 2 weeks. FDA pregnancy category C. It is not known whether docusate will harm an unborn baby. Do not use this medicine without a doctor's advice if you are pregnant. It is not known whether docusate passes into breast milk or if it could harm a nursing baby. Do not use this medicine without a doctor's advice if you are breast-feeding a baby. Do not give this medicine to a child younger than 10years old without the advice of a doctor. How should I use docusate? Use exactly as directed on the label, or as prescribed by your doctor. Do not use in larger or smaller amounts or for longer than recommended. Take this medicine with a full glass of water. Drink plenty of liquids while you are taking docusate. Do not crush, chew, break, or open a docusate capsule or tablet. Swallow it whole. Measure liquid medicine with the dosing syringe provided, or with a special dose-measuring spoon or medicine cup. If you do not have a dose-measuring device, ask your pharmacist for one. Mix the liquid with 6 to 8 ounces of milk, fruit juice, or infant formula and drink the mixture right away.   After taking docusate by mouth (tablets, capsules, liquid), you should have a bowel movement within 12 to 72 hours. Do not take docusate rectal enema by mouth. It is for use only in your rectum. Wash your hands before and after using docusate enema. Try to empty your bowel and bladder just before using the enema. Remove the cap from the enema applicator tip. Lie down on your left side with your knees bent, and gently insert the tip of the enema applicator into the rectum. Squeeze the tube to empty the entire contents into the rectum. Throw away the tube, even if there is still some medicine left in it. For best results, hold in the enema for as long as possible, or until you have a bowel movement. The rectal enema should produce a bowel movement within 2 to 15 minutes. Do not use docusate for longer than 7 days unless your doctor has told you to. Call your doctor if your symptoms do not improve, or if you have not had a bowel movement within 1 to 3 days. Overuse of a stool softener can lead to serious medical problems. Store at room temperature away from moisture and heat. What happens if I miss a dose? Since docusate is used when needed, you may not be on a dosing schedule. If you are on a schedule, use the missed dose as soon as you remember. Skip the missed dose if it is almost time for your next scheduled dose. Do not use extra medicine to make up the missed dose. What happens if I overdose? Seek emergency medical attention or call the Poison Help line at 1-828.410.5595. Overdose symptoms may include nausea, vomiting or stomach pain. What should I avoid while using docusate? Avoid using laxatives or other stool softeners unless your doctor has told you to. What are the possible side effects of docusate? Get emergency medical help if you have any of these signs of an allergic reaction: hives; difficult breathing; swelling of your face, lips, tongue, or throat.   Stop using docusate and call your doctor at once if you have:  · pounding heartbeats or fluttering in your chest;  · a light-headed feeling, like you might pass out;  · rectal bleeding or irritation;  · numbness or a rash around your rectum;  · vomiting, severe diarrhea or stomach cramps; or  · continued constipation, or no bowel movement. Common side effects may include:  · dizziness, weakness;  · gas, bloating, mild diarrhea;  · rectal irritation; or  · sweating. This is not a complete list of side effects and others may occur. Call your doctor for medical advice about side effects. You may report side effects to FDA at 2-032-GZJ-0587. What other drugs will affect docusate? Other drugs may interact with docusate, including prescription and over-the-counter medicines, vitamins, and herbal products. Tell each of your health care providers about all medicines you use now and any medicine you start or stop using. Where can I get more information? Your pharmacist can provide more information about docusate. Remember, keep this and all other medicines out of the reach of children, never share your medicines with others, and use this medication only for the indication prescribed. Every effort has been made to ensure that the information provided by [address] is accurate, up-to-date, and complete, but no guarantee is made to that effect. Drug information contained herein may be time sensitive. [employer] information has been compiled for use by healthcare practitioners and consumers in the [place] and therefore [employer] does not warrant that uses outside of the [place] are appropriate, unless specifically indicated otherwise. [facility] drug information does not endorse drugs, diagnose patients or recommend therapy.  [facility] drug information is an informational resource designed to assist licensed healthcare practitioners in caring for their patients and/or to serve consumers viewing this service as a supplement to, and not a substitute for, the expertise, skill, knowledge and judgment of healthcare practitioners. The absence of a warning for a given drug or drug combination in no way should be construed to indicate that the drug or drug combination is safe, effective or appropriate for any given patient. [facility] does not assume any responsibility for any aspect of healthcare administered with the aid of information [facility] provides. The information contained herein is not intended to cover all possible uses, directions, precautions, warnings, drug interactions, allergic reactions, or adverse effects. If you have questions about the drugs you are taking, check with your doctor, nurse or pharmacist.  Copyright 6905-4716 [address]: 3.03. Revision date: 2/12/2014. Care instructions adapted under license by [facility] ([facility]). If you have questions about a medical condition or this instruction, always ask your healthcare professional. [place] [address] any warranty or liability for your use of this information. Patient Education        omeprazole  Pronunciation:  oh MEP ra zol  Brand:  FIRST Omeprazole, Omeprazole + SyrSpend SF [place], PriLOSEC, PriLOSEC OTC  What is the most important information I should know about omeprazole? Follow all directions on your medicine label and package. Tell each of your healthcare providers about all your medical conditions, allergies, and all medicines you use. What is omeprazole? Omeprazole is a proton pump inhibitor that decreases the amount of acid produced in the stomach. Omeprazole is used to treat symptoms of gastroesophageal reflux disease (GERD) and other conditions caused by excess stomach acid. Omeprazole is also used to promote healing of erosive esophagitis (damage to your esophagus caused by stomach acid). Omeprazole may also be given together with antibiotics to treat gastric ulcer caused by infection with helicobacter pylori (H. pylori).   Over-the-counter (OTC) omeprazole is used to help control heartburn that occurs 2 or more days per week. This medicine not for immediate relief of heartburn symptoms. OTC omeprazole must be taken on a regular basis for 14 days in a row. Omeprazole may also be used for purposes not listed in this medication guide. What should I discuss with my healthcare provider before taking omeprazole? Heartburn can mimic early symptoms of a heart attack. Get emergency medical help if you have chest pain that spreads to your jaw or shoulder and you feel anxious or light-headed. You should not use omeprazole if you are allergic to it, or if:  · you are allergic to other proton pump inhibitors (esomeprazole, lansoprazole, pantoprazole, rabeprazole, Dexilant, Nexium, Prevacid, Protonix, AcipHex, and others); or  · you also take HIV medication that contains rilpivirine (such as Trudy Boone, Elly Dusky). Ask a doctor or pharmacist if it is safe for you to use omeprazole if you have other medical conditions, especially:  · trouble or pain with swallowing;  · bloody or black stools, vomit that looks like blood or coffee grounds;  · heartburn that has lasted for over 3 months;  · frequent chest pain, heartburn with wheezing;  · unexplained weight loss;  · nausea or vomiting, stomach pain;  · liver disease;  · low levels of magnesium in your blood; or  · osteoporosis or low bone mineral density (osteopenia). You may be more likely to have a broken bone in your hip, wrist, or spine while taking a proton pump inhibitor. Talk with your doctor about ways to keep your bones healthy. It is not known whether this medicine will harm an unborn baby. Tell your doctor if you are pregnant or plan to become pregnant. Do not use this medicine without a doctor's advice if you are breast-feeding. Do not give this medicine to a child without medical advice. How should I take omeprazole?   Follow all directions on your prescription label and read all medication guides call the Poison Help line at 1-874.948.4659. What should I avoid while taking omeprazole? Diarrhea may be a sign of a new infection. If you have diarrhea that is watery or bloody, call your doctor before using anti-diarrhea medicine. What are the possible side effects of omeprazole? Get emergency medical help if you have signs of an allergic reaction: hives; difficulty breathing; swelling of your face, lips, tongue, or throat. Stop using omeprazole and call your doctor at once if you have:  · severe stomach pain, diarrhea that is watery or bloody;  · a rash or joint pain;  · new or unusual pain in your wrist, thigh, hip, or back;  · seizure (convulsions);  · kidney problems --urinating more or less than usual, blood in your urine, swelling, rapid weight gain; or  · symptoms of low magnesium --drowsiness, confusion, feeling irritable, fast heartbeats, tremors, twitching, muscle cramps, numbness, tingling, or seizure. Common side effects may include:  · stomach pain, gas;  · nausea, vomiting, diarrhea; or  · headache. This is not a complete list of side effects and others may occur. Call your doctor for medical advice about side effects. You may report side effects to FDA at 5-410-NVA-9780. What other drugs will affect omeprazole? Sometimes it is not safe to use certain medications at the same time. Some drugs can affect your blood levels of other drugs you take, which may increase side effects or make the medications less effective.   Ask a doctor or pharmacist before using omeprazole with any other medications, especially:  · diazepam (Valium);  · digoxin;  · methotrexate;  · mycophenolate mofetil;  · tacrolimus;  · an antibiotic --ampicillin, amoxicillin, clarithromycin, rifampin;  · antifungal medicine --ketoconazole, voriconazole;  · HIV medication --atazanavir, nelfinavir, saquinavir;  · iron-containing medicines --ferrous fumarate, ferrous gluconate, ferrous sulfate, and others; or  · medicine to prevent blood clots --cilostazol, clopidogrel, warfarin (Coumadin, Jantoven). This list is not complete and many other drugs may affect omeprazole. This includes prescription and over-the-counter medicines, vitamins, and herbal products. Not all possible drug interactions are listed here. Where can I get more information? Your pharmacist can provide more information about omeprazole. Remember, keep this and all other medicines out of the reach of children, never share your medicines with others, and use this medication only for the indication prescribed. Every effort has been made to ensure that the information provided by [address] is accurate, up-to-date, and complete, but no guarantee is made to that effect. Drug information contained herein may be time sensitive. [facility] information has been compiled for use by healthcare practitioners and consumers in the [place] and therefore [employer] does not warrant that uses outside of the [place] are appropriate, unless specifically indicated otherwise. [facility]'s drug information does not endorse drugs, diagnose patients or recommend therapy. [facility]'s drug information is an informational resource designed to assist licensed healthcare practitioners in caring for their patients and/or to serve consumers viewing this service as a supplement to, and not a substitute for, the expertise, skill, knowledge and judgment of healthcare practitioners. The absence of a warning for a given drug or drug combination in no way should be construed to indicate that the drug or drug combination is safe, effective or appropriate for any given patient. [employer] does not assume any responsibility for any aspect of healthcare administered with the aid of information [facility] provides. The information contained herein is not intended to cover all possible uses, directions, precautions, warnings, drug interactions, allergic reactions, or adverse effects.  If you have questions about the drugs you are taking, check with your doctor, nurse or pharmacist.  Copyright 9235-3847 [address]: 18.01. Revision date: 3/14/2018. Care instructions adapted under license by [facility] ([facility]). If you have questions about a medical condition or this instruction, always ask your healthcare professional. [place] [address] any warranty or liability for your use of this information.

## 2019-04-30 NOTE — PROGRESS NOTES
alcohol or use drugs. Family history:  Her family history includes Breast Cancer in her maternal aunt; Cancer in her maternal grandmother; Diabetes in her maternal grandmother; High Blood Pressure in her mother; No Known Problems in her sister and sister. Objective    Vitals:    04/30/19 1035   BP: 122/74   Pulse: 73   Resp: 16   SpO2: 98%        Physical exam    Physical Exam   Constitutional: She is oriented to person, place, and time. She appears well-developed and well-nourished. HENT:   Head: Normocephalic and atraumatic. Eyes: Pupils are equal, round, and reactive to light. Conjunctivae and EOM are normal.   Neck: Normal range of motion. Neck supple. No JVD present. No tracheal deviation present. No thyromegaly present. Cardiovascular: Normal rate, regular rhythm, normal heart sounds and intact distal pulses. Exam reveals no gallop and no friction rub. No murmur heard. Pulmonary/Chest: Effort normal and breath sounds normal. No stridor. No respiratory distress. She has no wheezes. She has no rales. She exhibits no tenderness. Abdominal: Soft. Bowel sounds are normal. She exhibits no distension and no mass. There is no tenderness. There is no rebound and no guarding. Obese   Musculoskeletal: Normal range of motion. Lymphadenopathy:     She has no cervical adenopathy. Neurological: She is alert and oriented to person, place, and time. Skin: Skin is warm and dry. Psychiatric: She has a normal mood and affect. Vitals reviewed.       Hospital Outpatient Visit on 03/08/2019   Component Date Value Ref Range Status    Color, UA 03/08/2019 YELLOW  UYELL Final    Clarity, UA 03/08/2019 HAZY* CLEAR Final    Glucose, Urine 03/08/2019 NEGATIVE  NEG MG/DL Final    Bilirubin Urine 03/08/2019 NEGATIVE  NEG MG/DL Final    Ketones, Urine 03/08/2019 NEGATIVE  NEG MG/DL Final    Specific Gravity, UA 03/08/2019 1.015  1.001 - 1.035 Final    Blood, Urine 03/08/2019 LARGE* NEG Final    pH, Urine 03/08/2019 9.0* 5.0 - 8.0 Final    Protein, UA 03/08/2019 30* NEG MG/DL Final    Urobilinogen, Urine 03/08/2019 NORMAL  0.2 - 1.0 MG/DL Final    Nitrite Urine, Quantitative 03/08/2019 POSITIVE* NEG Final    Leukocyte Esterase, Urine 03/08/2019 TRACE* NEG Final    RBC, UA 03/08/2019 2,900* 0 - 6 /HPF Final    WBC, UA 03/08/2019 12* 0 - 5 /HPF Final    Bacteria, UA 03/08/2019 OCCASIONAL* NEG /HPF Final    WBC Clumps, UA 03/08/2019 RARE  /HPF Final    Yeast, UA 03/08/2019 RARE  /HPF Final    Squam Epithel, UA 03/08/2019 2  /HPF Final    Trichomonas, UA 03/08/2019 NONE SEEN  NOSEE /HPF Final    Specimen 03/08/2019 URINE CLEAN CATCH   Final    Special Requests 03/08/2019 URINE CLEAN CATCH   Final    Total [place] Count 03/08/2019 >327326 COL/ML   Final    Culture 03/08/2019 *  Final                    Value:KLEBSIELLA PNEUMONIAE  HEAVY GROWTH      WBC 03/08/2019 8.9  4.0 - 10.5 K/CU MM Final    RBC 03/08/2019 4.27  4.2 - 5.4 M/CU MM Final    Hemoglobin 03/08/2019 13.1  12.5 - 16.0 GM/DL Final    Hematocrit 03/08/2019 41.0  37 - 47 % Final    MCV 03/08/2019 96.0  78 - 100 FL Final    MCH 03/08/2019 30.7  27 - 31 PG Final    MCHC 03/08/2019 32.0  32.0 - 36.0 % Final    RDW 03/08/2019 12.7  11.7 - 14.9 % Final    Platelets 65/97/8574 304  140 - 440 K/CU MM Final    MPV 03/08/2019 10.0  7.5 - 11.1 FL Final    Differential Type 03/08/2019 AUTOMATED DIFFERENTIAL   Final    Segs Relative 03/08/2019 67.0* 36 - 66 % Final    Lymphocytes % 03/08/2019 23.1* 24 - 44 % Final    Monocytes % 03/08/2019 7.2* 0 - 4 % Final    Eosinophils % 03/08/2019 1.8  0 - 3 % Final    Basophils % 03/08/2019 0.6  0 - 1 % Final    Segs Absolute 03/08/2019 5.9  K/CU MM Final    Lymphocytes # 03/08/2019 2.0  K/CU MM Final    Monocytes # 03/08/2019 0.6  K/CU MM Final    Eosinophils # 03/08/2019 0.2  K/CU MM Final    Basophils # 03/08/2019 0.1  K/CU MM Final    Nucleated RBC % 03/08/2019 0.0  % Final    Total Nucleated

## 2019-04-30 NOTE — TELEPHONE ENCOUNTER
I called Yesica to complete PA for Linzess. This cannot move forward because Pt has to try Trulance prior to Linzess.

## 2019-05-13 ENCOUNTER — TELEPHONE (OUTPATIENT)
Dept: GASTROENTEROLOGY | Age: 34
End: 2019-05-13

## 2019-05-13 NOTE — TELEPHONE ENCOUNTER
Pt started Trulance and she is having frequent BM's that is watery. She believes she may be backed up quite a bit and it's all coming out now, but she wants to know if this is normal? She agreed to give it one more day to see if it slows down. Wanted to make Jennifer Diaz aware of how the medication is working. She does feel better since she is able to use the bathroom now, but she is also having some abd pain/discomfort.

## 2019-07-19 ENCOUNTER — OFFICE VISIT (OUTPATIENT)
Dept: GASTROENTEROLOGY | Age: 34
End: 2019-07-19
Payer: COMMERCIAL

## 2019-07-19 VITALS
OXYGEN SATURATION: 98 % | HEART RATE: 77 BPM | BODY MASS INDEX: 34.63 KG/M2 | WEIGHT: 176.4 LBS | HEIGHT: 60 IN | SYSTOLIC BLOOD PRESSURE: 124 MMHG | DIASTOLIC BLOOD PRESSURE: 68 MMHG

## 2019-07-19 DIAGNOSIS — R93.89 ABNORMAL FINDING ON CT SCAN: ICD-10-CM

## 2019-07-19 DIAGNOSIS — R14.0 ABDOMINAL BLOATING: Primary | ICD-10-CM

## 2019-07-19 DIAGNOSIS — K21.9 GASTROESOPHAGEAL REFLUX DISEASE WITHOUT ESOPHAGITIS: ICD-10-CM

## 2019-07-19 DIAGNOSIS — K59.04 CHRONIC IDIOPATHIC CONSTIPATION: ICD-10-CM

## 2019-07-19 PROCEDURE — 99214 OFFICE O/P EST MOD 30 MIN: CPT | Performed by: NURSE PRACTITIONER

## 2019-07-19 PROCEDURE — G8417 CALC BMI ABV UP PARAM F/U: HCPCS | Performed by: NURSE PRACTITIONER

## 2019-07-19 PROCEDURE — G8427 DOCREV CUR MEDS BY ELIG CLIN: HCPCS | Performed by: NURSE PRACTITIONER

## 2019-07-19 PROCEDURE — 1036F TOBACCO NON-USER: CPT | Performed by: NURSE PRACTITIONER

## 2019-07-19 RX ORDER — OMEPRAZOLE 20 MG/1
20 CAPSULE, DELAYED RELEASE ORAL
Qty: 60 CAPSULE | Refills: 5 | Status: SHIPPED | OUTPATIENT
Start: 2019-07-19 | End: 2020-04-30 | Stop reason: SDUPTHER

## 2019-07-19 ASSESSMENT — ENCOUNTER SYMPTOMS
BACK PAIN: 0
EYE PAIN: 0
DIARRHEA: 0
VOMITING: 0
COUGH: 0
ABDOMINAL PAIN: 0
CONSTIPATION: 1
BLOOD IN STOOL: 0
COLOR CHANGE: 0
NAUSEA: 0
WHEEZING: 0
SHORTNESS OF BREATH: 0

## 2019-07-19 NOTE — PATIENT INSTRUCTIONS
Version: 12.0  © 2268-0021 [employer], [employer]. Care instructions adapted under license by [facility] ([facility]). If you have questions about a medical condition or this instruction, always ask your healthcare professional. [address] any warranty or liability for your use of this information. Patient Education        Gastroesophageal Reflux Disease (GERD): Care Instructions  Your Care Instructions    Gastroesophageal reflux disease (GERD) is the backward flow of stomach acid into the esophagus. The esophagus is the tube that leads from your throat to your stomach. A one-way valve prevents the stomach acid from moving up into this tube. When you have GERD, this valve does not close tightly enough. If you have mild GERD symptoms including heartburn, you may be able to control the problem with antacids or over-the-counter medicine. Changing your diet, losing weight, and making other lifestyle changes can also help reduce symptoms. Follow-up care is a key part of your treatment and safety. Be sure to make and go to all appointments, and call your doctor if you are having problems. It's also a good idea to know your test results and keep a list of the medicines you take. How can you care for yourself at home? · Take your medicines exactly as prescribed. Call your doctor if you think you are having a problem with your medicine. · Your doctor may recommend over-the-counter medicine. For mild or occasional indigestion, antacids, such as Tums, Gaviscon, Mylanta, or Maalox, may help. Your doctor also may recommend over-the-counter acid reducers, such as Pepcid AC, Tagamet HB, Zantac 75, or Prilosec. Read and follow all instructions on the label. If you use these medicines often, talk with your doctor. · Change your eating habits. ? It's best to eat several small meals instead of two or three large meals. ? After you eat, wait 2 to 3 hours before you lie down. ?  Chocolate, mint, and problems. It's also a good idea to know your test results and keep a list of the medicines you take. How can you care for yourself at home? · For constipation:  ? Include fruits, vegetables, beans, and whole grains in your diet each day. These foods are high in fiber. ? Drink plenty of fluids. If you have kidney, heart, or liver disease and have to limit fluids, talk with your doctor before you increase the amount of fluids you drink. ? Take a fiber supplement, such as Citrucel or Metamucil, every day if needed. Read and follow all instructions on the label. ? Schedule time each day for a bowel movement. Having a daily routine may help. Take your time and do not strain when having a bowel movement. · If you often have diarrhea, limit foods and drinks that make it worse. These are different for each person but may include caffeine (found in coffee, tea, chocolate, and cola drinks), alcohol, fatty foods, gas-producing foods (such as beans, cabbage, and broccoli), some dairy products, and spicy foods. Do not eat candy or gum that contains sorbitol. · Keep a daily diary of what you eat and what symptoms you have. This may help find foods that cause you problems. · Eat slowly. Try to make mealtime relaxing. · Find ways to reduce stress. · Get at least 30 minutes of exercise on most days of the week. Exercise can help reduce tension and prevent constipation. Walking is a good choice. You also may want to do other activities, such as running, swimming, cycling, or playing tennis or team sports. When should you call for help?   Call your doctor now or seek immediate medical care if:    · Your pain is different than usual or occurs with fever.     · You lose weight without trying, or you lose your appetite and you do not know why.     · Your symptoms often wake you from sleep.     · Your stools are black and tarlike or have streaks of blood.    Watch closely for changes in your health, and be sure to contact your

## 2019-08-19 ENCOUNTER — HOSPITAL ENCOUNTER (OUTPATIENT)
Dept: CT IMAGING | Age: 34
Discharge: HOME | End: 2019-08-19
Payer: COMMERCIAL

## 2019-08-19 DIAGNOSIS — R11.0 NAUSEA: ICD-10-CM

## 2019-08-19 DIAGNOSIS — R10.33 UMBILICAL PAIN: ICD-10-CM

## 2019-08-19 DIAGNOSIS — R14.0 ABDOMINAL BLOATING: ICD-10-CM

## 2019-08-19 PROCEDURE — 6360000004 HC RX CONTRAST MEDICATION: Performed by: NURSE PRACTITIONER

## 2019-08-19 PROCEDURE — 74177 CT ABD & PELVIS W/CONTRAST: CPT

## 2019-08-19 RX ORDER — SODIUM CHLORIDE 0.9 % (FLUSH) 0.9 %
25 SYRINGE (ML) INJECTION DAILY
Status: DISCONTINUED | OUTPATIENT
Start: 2019-08-19 | End: 2019-08-20 | Stop reason: HOSPADM

## 2019-08-19 RX ORDER — SODIUM CHLORIDE 9 MG/ML
INJECTION, SOLUTION INTRAVENOUS ONCE
Status: DISCONTINUED | OUTPATIENT
Start: 2019-08-19 | End: 2019-08-20 | Stop reason: HOSPADM

## 2019-08-19 RX ADMIN — IOPAMIDOL 75 ML: 755 INJECTION, SOLUTION INTRAVENOUS at 11:49

## 2019-09-03 ENCOUNTER — TELEPHONE (OUTPATIENT)
Dept: GASTROENTEROLOGY | Age: 34
End: 2019-09-03

## 2020-04-06 ENCOUNTER — TELEPHONE (OUTPATIENT)
Dept: GASTROENTEROLOGY | Age: 35
End: 2020-04-06

## 2020-04-09 ENCOUNTER — TELEPHONE (OUTPATIENT)
Dept: GASTROENTEROLOGY | Age: 35
End: 2020-04-09

## 2020-04-30 RX ORDER — OMEPRAZOLE 20 MG/1
20 CAPSULE, DELAYED RELEASE ORAL
Qty: 60 CAPSULE | Refills: 5 | Status: SHIPPED | OUTPATIENT
Start: 2020-04-30 | End: 2020-11-19 | Stop reason: SDUPTHER

## 2020-06-02 RX ORDER — DOCUSATE SODIUM 100 MG/1
CAPSULE, LIQUID FILLED ORAL
COMMUNITY
Start: 2020-03-29 | End: 2020-06-02 | Stop reason: SDUPTHER

## 2020-06-02 RX ORDER — DOCUSATE SODIUM 100 MG/1
100 CAPSULE, LIQUID FILLED ORAL 2 TIMES DAILY
Qty: 60 CAPSULE | Refills: 0 | Status: SHIPPED | OUTPATIENT
Start: 2020-06-02 | End: 2020-06-30 | Stop reason: SDUPTHER

## 2020-06-30 RX ORDER — DOCUSATE SODIUM 100 MG/1
100 CAPSULE, LIQUID FILLED ORAL 2 TIMES DAILY
Qty: 60 CAPSULE | Refills: 0 | Status: SHIPPED | OUTPATIENT
Start: 2020-06-30 | End: 2020-07-30 | Stop reason: SDUPTHER

## 2020-07-30 RX ORDER — DOCUSATE SODIUM 100 MG/1
100 CAPSULE, LIQUID FILLED ORAL 2 TIMES DAILY
Qty: 60 CAPSULE | Refills: 0 | Status: SHIPPED | OUTPATIENT
Start: 2020-07-30 | End: 2020-08-29

## 2020-09-17 RX ORDER — DOCUSATE SODIUM 100 MG/1
100 CAPSULE, LIQUID FILLED ORAL 2 TIMES DAILY
Qty: 60 CAPSULE | Refills: 3 | Status: SHIPPED | OUTPATIENT
Start: 2020-09-17 | End: 2020-11-16

## 2020-09-17 RX ORDER — DOCUSATE SODIUM 100 MG/1
100 CAPSULE, LIQUID FILLED ORAL 2 TIMES DAILY
COMMUNITY
End: 2020-09-17 | Stop reason: SDUPTHER

## 2020-11-19 ENCOUNTER — TELEPHONE (OUTPATIENT)
Dept: GASTROENTEROLOGY | Age: 35
End: 2020-11-19

## 2020-11-19 RX ORDER — OMEPRAZOLE 20 MG/1
20 CAPSULE, DELAYED RELEASE ORAL 2 TIMES DAILY
Qty: 60 CAPSULE | Refills: 5 | Status: SHIPPED | OUTPATIENT
Start: 2020-11-19 | End: 2021-07-10

## 2020-11-19 NOTE — TELEPHONE ENCOUNTER
Please call and schedule follow-up apt to discuss constipation. I have reordered her Prilosec and Plecanatide and sent this to her pharmacy.

## 2021-01-29 ENCOUNTER — HOSPITAL ENCOUNTER (EMERGENCY)
Age: 36
Discharge: HOME | End: 2021-01-29
Attending: EMERGENCY MEDICINE
Payer: COMMERCIAL

## 2021-01-29 ENCOUNTER — APPOINTMENT (OUTPATIENT)
Dept: CT IMAGING | Age: 36
End: 2021-01-29
Payer: COMMERCIAL

## 2021-01-29 VITALS
HEIGHT: 60 IN | HEART RATE: 79 BPM | WEIGHT: 176 LBS | OXYGEN SATURATION: 97 % | DIASTOLIC BLOOD PRESSURE: 96 MMHG | RESPIRATION RATE: 14 BRPM | BODY MASS INDEX: 34.55 KG/M2 | SYSTOLIC BLOOD PRESSURE: 142 MMHG | TEMPERATURE: 97.8 F

## 2021-01-29 DIAGNOSIS — N20.0 NEPHROLITHIASIS: ICD-10-CM

## 2021-01-29 DIAGNOSIS — N12 PYELONEPHRITIS: Primary | ICD-10-CM

## 2021-01-29 DIAGNOSIS — R10.9 LEFT FLANK PAIN: ICD-10-CM

## 2021-01-29 LAB
ALBUMIN SERPL-MCNC: 4.4 GM/DL (ref 3.4–5)
ALP BLD-CCNC: 82 IU/L (ref 40–129)
ALT SERPL-CCNC: 9 U/L (ref 10–40)
ANION GAP SERPL CALCULATED.3IONS-SCNC: 10 MMOL/L (ref 4–16)
AST SERPL-CCNC: 16 IU/L (ref 15–37)
BACTERIA: ABNORMAL /HPF
BASOPHILS ABSOLUTE: 0.1 K/CU MM
BASOPHILS RELATIVE PERCENT: 0.5 % (ref 0–1)
BILIRUB SERPL-MCNC: 0.5 MG/DL (ref 0–1)
BILIRUBIN URINE: NEGATIVE MG/DL
BLOOD, URINE: ABNORMAL
BUN BLDV-MCNC: 11 MG/DL (ref 6–23)
CALCIUM SERPL-MCNC: 9.3 MG/DL (ref 8.3–10.6)
CAST TYPE: ABNORMAL /HPF
CHLORIDE BLD-SCNC: 103 MMOL/L (ref 99–110)
CLARITY: ABNORMAL
CO2: 25 MMOL/L (ref 21–32)
COLOR: YELLOW
COMMENT UA: ABNORMAL
CREAT SERPL-MCNC: 0.8 MG/DL (ref 0.6–1.1)
CRYSTAL TYPE: NEGATIVE /HPF
DIFFERENTIAL TYPE: ABNORMAL
EOSINOPHILS ABSOLUTE: 0.1 K/CU MM
EOSINOPHILS RELATIVE PERCENT: 1.3 % (ref 0–3)
EPITHELIAL CELLS, UA: 25 /HPF
GFR AFRICAN AMERICAN: >60 ML/MIN/1.73M2
GFR NON-AFRICAN AMERICAN: >60 ML/MIN/1.73M2
GLUCOSE BLD-MCNC: 86 MG/DL (ref 70–99)
GLUCOSE, URINE: NEGATIVE MG/DL
HCT VFR BLD CALC: 39.1 % (ref 37–47)
HEMOGLOBIN: 13.1 GM/DL (ref 12.5–16)
IMMATURE NEUTROPHIL %: 0.2 % (ref 0–0.43)
INTERPRETATION: NORMAL
KETONES, URINE: NEGATIVE MG/DL
LEUKOCYTE ESTERASE, URINE: ABNORMAL
LIPASE: 23 IU/L (ref 13–60)
LYMPHOCYTES ABSOLUTE: 2.8 K/CU MM
LYMPHOCYTES RELATIVE PERCENT: 27.6 % (ref 24–44)
MCH RBC QN AUTO: 30.2 PG (ref 27–31)
MCHC RBC AUTO-ENTMCNC: 33.5 % (ref 32–36)
MCV RBC AUTO: 90.1 FL (ref 78–100)
MONOCYTES ABSOLUTE: 0.7 K/CU MM
MONOCYTES RELATIVE PERCENT: 6.5 % (ref 0–4)
NITRITE URINE, QUANTITATIVE: NEGATIVE
PDW BLD-RTO: 12.7 % (ref 11.7–14.9)
PH, URINE: 5 (ref 5–8)
PLATELET # BLD: 316 K/CU MM (ref 140–440)
PMV BLD AUTO: 9.8 FL (ref 7.5–11.1)
POTASSIUM SERPL-SCNC: 4 MMOL/L (ref 3.5–5.1)
PREGNANCY, URINE: NEGATIVE
PROTEIN UA: NEGATIVE MG/DL
RBC # BLD: 4.34 M/CU MM (ref 4.2–5.4)
RBC URINE: 100 /HPF (ref 0–6)
SEGMENTED NEUTROPHILS ABSOLUTE COUNT: 6.4 K/CU MM
SEGMENTED NEUTROPHILS RELATIVE PERCENT: 63.9 % (ref 36–66)
SODIUM BLD-SCNC: 138 MMOL/L (ref 135–145)
SPECIFIC GRAVITY UA: 1.02 (ref 1–1.03)
SPECIFIC GRAVITY, URINE: 1.02 (ref 1–1.03)
TOTAL IMMATURE NEUTOROPHIL: 0.02 K/CU MM
TOTAL PROTEIN: 7.9 GM/DL (ref 6.4–8.2)
UROBILINOGEN, URINE: 0.2 MG/DL (ref 0.2–1)
WBC # BLD: 10 K/CU MM (ref 4–10.5)
WBC UA: 7 /HPF (ref 0–5)

## 2021-01-29 PROCEDURE — 96365 THER/PROPH/DIAG IV INF INIT: CPT

## 2021-01-29 PROCEDURE — 74176 CT ABD & PELVIS W/O CONTRAST: CPT

## 2021-01-29 PROCEDURE — 6360000002 HC RX W HCPCS: Performed by: EMERGENCY MEDICINE

## 2021-01-29 PROCEDURE — 80053 COMPREHEN METABOLIC PANEL: CPT

## 2021-01-29 PROCEDURE — 81001 URINALYSIS AUTO W/SCOPE: CPT

## 2021-01-29 PROCEDURE — 81025 URINE PREGNANCY TEST: CPT

## 2021-01-29 PROCEDURE — 85025 COMPLETE CBC W/AUTO DIFF WBC: CPT

## 2021-01-29 PROCEDURE — 2580000003 HC RX 258: Performed by: EMERGENCY MEDICINE

## 2021-01-29 PROCEDURE — 83690 ASSAY OF LIPASE: CPT

## 2021-01-29 PROCEDURE — 99283 EMERGENCY DEPT VISIT LOW MDM: CPT

## 2021-01-29 RX ORDER — 0.9 % SODIUM CHLORIDE 0.9 %
1000 INTRAVENOUS SOLUTION INTRAVENOUS ONCE
Status: COMPLETED | OUTPATIENT
Start: 2021-01-29 | End: 2021-01-29

## 2021-01-29 RX ORDER — KETOROLAC TROMETHAMINE 30 MG/ML
30 INJECTION, SOLUTION INTRAMUSCULAR; INTRAVENOUS ONCE
Status: COMPLETED | OUTPATIENT
Start: 2021-01-29 | End: 2021-01-29

## 2021-01-29 RX ORDER — ONDANSETRON 4 MG/1
4 TABLET, ORALLY DISINTEGRATING ORAL 3 TIMES DAILY PRN
Qty: 21 TABLET | Refills: 0 | Status: SHIPPED | OUTPATIENT
Start: 2021-01-29 | End: 2021-07-10

## 2021-01-29 RX ORDER — ONDANSETRON 2 MG/ML
4 INJECTION INTRAMUSCULAR; INTRAVENOUS EVERY 6 HOURS PRN
Status: DISCONTINUED | OUTPATIENT
Start: 2021-01-29 | End: 2021-01-29 | Stop reason: HOSPADM

## 2021-01-29 RX ORDER — AMOXICILLIN AND CLAVULANATE POTASSIUM 875; 125 MG/1; MG/1
1 TABLET, FILM COATED ORAL 2 TIMES DAILY
Qty: 20 TABLET | Refills: 0 | Status: SHIPPED | OUTPATIENT
Start: 2021-01-29 | End: 2021-02-08

## 2021-01-29 RX ORDER — HYDROCODONE BITARTRATE AND ACETAMINOPHEN 5; 325 MG/1; MG/1
1 TABLET ORAL EVERY 8 HOURS PRN
Qty: 9 TABLET | Refills: 0 | Status: SHIPPED | OUTPATIENT
Start: 2021-01-29 | End: 2021-02-01

## 2021-01-29 RX ADMIN — KETOROLAC TROMETHAMINE 30 MG: 30 INJECTION, SOLUTION INTRAMUSCULAR; INTRAVENOUS at 15:03

## 2021-01-29 RX ADMIN — CEFTRIAXONE SODIUM 1000 MG: 1 INJECTION, POWDER, FOR SOLUTION INTRAMUSCULAR; INTRAVENOUS at 16:46

## 2021-01-29 RX ADMIN — SODIUM CHLORIDE 1000 ML: 9 INJECTION, SOLUTION INTRAVENOUS at 15:03

## 2021-01-29 RX ADMIN — ONDANSETRON 4 MG: 2 INJECTION INTRAMUSCULAR; INTRAVENOUS at 15:03

## 2021-01-29 ASSESSMENT — PAIN DESCRIPTION - DESCRIPTORS: DESCRIPTORS: SHARP

## 2021-01-29 ASSESSMENT — PAIN SCALES - GENERAL
PAINLEVEL_OUTOF10: 10
PAINLEVEL_OUTOF10: 10

## 2021-01-29 ASSESSMENT — PAIN DESCRIPTION - ORIENTATION: ORIENTATION: LEFT

## 2021-01-29 ASSESSMENT — PAIN DESCRIPTION - FREQUENCY: FREQUENCY: INTERMITTENT

## 2021-01-29 ASSESSMENT — PAIN DESCRIPTION - PAIN TYPE: TYPE: ACUTE PAIN

## 2021-01-29 ASSESSMENT — PAIN DESCRIPTION - LOCATION: LOCATION: FLANK

## 2021-01-29 NOTE — ED PROVIDER NOTES
Emergency Department Encounter    Patient: Kusum Hackett  MRN: [id number]  : 1985  Date of Evaluation: 2021  ED Provider:  MAGGY KIM      Triage Chief Complaint:   Flank Pain (left side)      [place]:  Kusum Hackett is a 37 y.o. female that presents to the emergency department with left flank pain. Patient reports that sometime yesterday afternoon she noticed the spontaneous onset of pain in the mid, left flank. This is generally aching in quality. Pain does worsen with twisting, movement, and taking a deep breath. She recalls no injury. She does report a history of low back pain but denies back surgery or pain shots. She does report history of renal stones, but this does not feel similar. She denies diarrhea, constipation, or urinary symptoms. She denies fevers or chills. She reports no obvious sick contacts. Patient reports no other particular provocative or alleviating factors. ROS - see HPI, below listed is current ROS at time of my eval:  CONSTITUTIONAL: No fevers, chills, or sweats. EYES: No vision change, redness, drainage, or discharge. HENT: No sore throat, runny nose, or earache. No dental pain. No painful swallowing. RESPIRATORY: No difficulty breathing, cough, or sputum production. CARDIOVASCULAR: No anginal-type chest pain, orthopnea, or edema. GASTROINTESTINAL: No nausea, vomiting, or abdominal pain. No diarrhea or constipation. No hematemesis, hematochezia, or melena. GENITOURINARY: No frequency, urgency, or dysuria. No hematuria. MUSCULOSKELETAL: No recent injury. NEUROLOGICAL: No focal weakness, numbness, or tingling. SKIN: No rashes or other lesions reported. No yellowing of the skin.       Past Medical History:   Diagnosis Date    Abdominal hernia     Arthritis     Chronic back pain     no specific injury    Chronic migraine w/o aura, not intractable, w stat migr     History of kidney stones     \"had kidney stones age 29- passed it\"    Hypertension     TMJ (dislocation of temporomandibular joint)      Past Surgical History:   Procedure Laterality Date     SECTION  2006 , ( with BPS)    COLONOSCOPY  2018    internal grade 1 hemorrhoids    ENDOSCOPY, COLON, DIAGNOSTIC  2018    multiple biopsiesto rule out h pylori and sprue, mild erythematous found in antrum.     HERNIA REPAIR  2018    Repair incarcerated epigastric hernia     Family History   Problem Relation Age of Onset    High Blood Pressure Mother     No Known Problems Sister     Breast Cancer Maternal Aunt     Cancer Maternal Grandmother     Diabetes Maternal Grandmother     No Known Problems Sister      Social History     Socioeconomic History    Marital status:      Spouse name: Not on file    Number of children: Not on file    Years of education: Not on file    Highest education level: Not on file   Occupational History    Not on file   Social Needs    Financial resource strain: Not on file    Food insecurity     Worry: Not on file     Inability: Not on file    Transportation needs     Medical: Not on file     Non-medical: Not on file   Tobacco Use    Smoking status: Never Smoker    Smokeless tobacco: Never Used   Substance and Sexual Activity    Alcohol use: No    Drug use: No    Sexual activity: Yes     Partners: Male     Comment: stable relationship for 11 years,  3/25/2017   Lifestyle    Physical activity     Days per week: Not on file     Minutes per session: Not on file    Stress: Not on file   Relationships    Social connections     Talks on phone: Not on file     Gets together: Not on file     Attends [employer] service: Not on file     Active member of club or organization: Not on file     Attends meetings of clubs or organizations: Not on file     Relationship status: Not on file    Intimate partner violence     Fear of current or ex partner: Not on file     Emotionally abused: Not on file     Physically abused: Not on file     Forced sexual activity: Not on file   Other Topics Concern    Not on file   Social History Narrative    Not on file     No current facility-administered medications for this encounter. Current Outpatient Medications   Medication Sig Dispense Refill    amoxicillin-clavulanate (AUGMENTIN) 875-125 MG per tablet Take 1 tablet by mouth 2 times daily for 10 days 20 tablet 0    ondansetron (ZOFRAN-ODT) 4 MG disintegrating tablet Take 1 tablet by mouth 3 times daily as needed for Nausea or Vomiting 21 tablet 0    HYDROcodone-acetaminophen (NORCO) 5-325 MG per tablet Take 1 tablet by mouth every 8 hours as needed for Pain for up to 3 days. Intended supply: 3 days. Take lowest dose possible to manage pain 9 tablet 0    Plecanatide 3 MG TABS Take 1 tablet by mouth daily 30 tablet 5    omeprazole (PRILOSEC) 20 MG delayed release capsule Take 1 capsule by mouth 2 times daily 60 capsule 5    SUMAtriptan (IMITREX) 50 MG tablet Take 100 mg by mouth as needed       tiZANidine (ZANAFLEX) 4 MG tablet Take 4 mg by mouth nightly       topiramate (TOPAMAX) 100 MG tablet Take 100 mg by mouth 2 times daily      propranolol (INDERAL LA) 120 MG extended release capsule Take 160 mg by mouth daily      gabapentin (NEURONTIN) 600 MG tablet Take 800 mg by mouth 3 times daily. Allergies   Allergen Reactions    Tramadol        Nursing Notes Reviewed    Physical Exam:  Triage VS:    ED Triage Vitals [01/29/21 1432]   Enc Vitals Group      BP (!) 142/96      Pulse 79      Resp 14      Temp 97.8 °F (36.6 °C)      Temp src       SpO2 97 %      Weight 176 lb (79.8 kg)      Height 5' (1.524 m)      Head Circumference       Peak Flow       Pain Score       Pain Loc       Pain Edu? Excl. in [address]? My pulse ox interpretation is - normal    GENERAL: Patient is awake, alert, and oriented appropriately. Patient is resting comfortably in a still position on the exam table.   Patient speaking in full and complete sentences. Well-nourished and well-developed. HEENT: Normocephalic and atraumatic. No midface, zygomatic, maxillary, or mandibular tenderness. No dental malocclusion. Pupils equal, round, and reactive to light. No redness or matting. Bilateral external ears are unremarkable. Nasal mucosa is pink without purulence. Oral mucosa is moist and pink. NECK: Supple without Kernig's or Brudzinski signs. No significant lymphadenopathy or limitation range of motion. No midline spinal tenderness. RESPIRATORY: Symmetric aeration bilaterally. No audible wheezes, rales, rhonchi, or stridor. No chest wall tenderness. CARDIOVASCULAR: Regular rate and rhythm. No audible murmurs, rubs, or gallops. No central or peripheral cyanosis. GASTROINTESTINAL: Somewhat generalized left-sided and left lower quadrant abdominal pain. Otherwise, soft, nontender, and nondistended. No McBurney's or Tapia's point tenderness. No guarding, rebound, rigidity. No mass or pulsatile mass. Bowel sounds are present in all quadrants. Mild left costovertebral angle tenderness. NEUROLOGICAL: Awake, alert and oriented x 3. Cranial nerves III through XII are grossly intact as tested without facial droop or dermatomal paresthesias. Of note, forehead wrinkles are symmetric and intact. Conjugate gaze without entrapment. No asymmetry of the corners of the mouth or nasolabial folds. No gross motor or cerebellar deficits. BACK/MUSCULOSKELETAL: Somewhat generalized left lumbar muscular tenderness and mild spasm. No midline tenderness, crepitus, or deformity. No asymmetric edema, Tharon Miami' sign, or cords. No tenderness or limitation range of motion to the bilateral shoulders, elbows, wrists, hips, knees, or ankles. No accompanying long bone tenderness or deformity. SKIN: Normal tone for ethnicity. Normal turgor and brisk capillary refill peripherally. No petechiae, purpura, vesicles, bullae, or other lesions. No icterus. PSYCHIATRIC: Normal mood. Normal affect. No voiced suicidal or homicidal ideation. Patient does not respond to internal stimuli. Emergency department course. Patient is brought to bed 1 and assessed and reassessed by me. After initial evaluation, orders are placed for medical screening studies including CBC, metabolic panel, and lipase among others. With the CVA tenderness, more generalized abdominal tenderness, and history of renal stones, CT scan of the abdomen pelvis is ordered. Medications are ordered as documented. Patient is agreeable to continuing plan. Upon most recent reevaluation, patient is resting little more comfortably. Abdomen has been nonsurgical.  CT scan shows some intrarenal stones, but there is no evidence of an obstructive uropathy. Patient will be treated for pyelonephritis, but she appears a good candidate for trial of outpatient management. Patient is agreeable. We have discussed all available results. Patient is satisfied with evaluation and agreeable to recommendations. Patient has had the opportunity to ask questions, and they have been answered to the best of my ability. Instructions are given to follow-up with primary care provider for reevaluation and further testing. Very strict return and follow-up instructions are provided. Patient seen during [facility] [place], I did don appropriate PPE during my encounters with the patient, including n95 (when appropriate) mask and eye protection as appropriate.     I have reviewed and interpreted all of the currently available lab results from this visit (if applicable):  Results for orders placed or performed during the hospital encounter of 01/29/21   CBC Auto Differential   Result Value Ref Range    WBC 10.0 4.0 - 10.5 K/CU MM    RBC 4.34 4.2 - 5.4 M/CU MM    Hemoglobin 13.1 12.5 - 16.0 GM/DL    Hematocrit 39.1 37 - 47 %    MCV 90.1 78 - 100 FL    MCH 30.2 27 - 31 PG    MCHC 33.5 32.0 - 36.0 %    RDW 12.7 11.7 - 14.9 %    Platelets 544 553 - 327 K/CU MM    MPV 9.8 7.5 - 11.1 FL    Differential Type AUTOMATED DIFFERENTIAL     Segs Relative 63.9 36 - 66 %    Lymphocytes % 27.6 24 - 44 %    Monocytes % 6.5 (H) 0 - 4 %    Eosinophils % 1.3 0 - 3 %    Basophils % 0.5 0 - 1 %    Segs Absolute 6.4 K/CU MM    Lymphocytes Absolute 2.8 K/CU MM    Monocytes Absolute 0.7 K/CU MM    Eosinophils Absolute 0.1 K/CU MM    Basophils Absolute 0.1 K/CU MM    Immature Neutrophil % 0.2 0 - 0.43 %    Total Immature Neutrophil 0.02 K/CU MM   Comprehensive Metabolic Panel w/ Reflex to MG   Result Value Ref Range    Sodium 138 135 - 145 MMOL/L    Potassium 4.0 3.5 - 5.1 MMOL/L    Chloride 103 99 - 110 mMol/L    CO2 25 21 - 32 MMOL/L    BUN 11 6 - 23 MG/DL    CREATININE 0.8 0.6 - 1.1 MG/DL    Glucose 86 70 - 99 MG/DL    Calcium 9.3 8.3 - 10.6 MG/DL    Albumin 4.4 3.4 - 5.0 GM/DL    Total Protein 7.9 6.4 - 8.2 GM/DL    Total Bilirubin 0.5 0.0 - 1.0 MG/DL    ALT 9 (L) 10 - 40 U/L    AST 16 15 - 37 IU/L    Alkaline Phosphatase 82 40 - 129 IU/L    GFR Non-African American >60 >60 mL/min/1.73m2    GFR African American >60 >60 mL/min/1.73m2    Anion Gap 10 4 - 16   Lipase   Result Value Ref Range    Lipase 23 13 - 60 IU/L   Urinalysis Reflex to Culture    Specimen: Urine   Result Value Ref Range    Color, UA YELLOW (A) YELLOW    Clarity, UA CLOUDY (A) CLEAR    Glucose, Urine NEGATIVE NEGATIVE MG/DL    Bilirubin Urine NEGATIVE NEGATIVE MG/DL    Ketones, Urine NEGATIVE NEGATIVE MG/DL    Specific Gravity, UA 1.025 1.001 - 1.035    Blood, Urine LARGE NUMBER OR AMOUNT OBSERVED (A) NEGATIVE    pH, Urine 5.0 5.0 - 8.0    Protein, UA NEGATIVE NEGATIVE MG/DL    Urobilinogen, Urine 0.2 0.2 - 1.0 MG/DL    Nitrite Urine, Quantitative NEGATIVE NEGATIVE    Leukocyte Esterase, Urine SMALL NUMBER OR AMOUNT OBSERVED (A) NEGATIVE    RBC,  (H) 0 - 6 /HPF    WBC, UA 7 (H) 0 - 5 /HPF    Epithelial Cells, UA 25 /HPF    Cast Type NO CAST FORMS SEEN NO CAST FORMS SEEN /HPF    Bacteria, UA FEW (A) NEGATIVE /HPF    Crystal Type NEGATIVE NEGATIVE /HPF    Urinalysis Comments 4+ MUCAS OBSERVED    Pregnancy, Urine   Result Value Ref Range    Pregnancy, Urine NEGATIVE NEGATIVE    Specific Gravity, Urine 1.025 1.001 - 1.035    Interpretation HCG METHOD LIMITATIONS:         Radiographs (if obtained):  Radiologist's Report Reviewed:  Ct Abdomen Pelvis Wo Contrast Additional Contrast? None    Result Date: 1/29/2021  EXAMINATION: CT OF THE ABDOMEN AND PELVIS WITHOUT CONTRAST 1/29/2021 3:28 pm TECHNIQUE: CT of the abdomen and pelvis was performed without the administration of intravenous contrast. Multiplanar reformatted images are provided for review. Dose modulation, iterative reconstruction, and/or weight based adjustment of the mA/kV was utilized to reduce the radiation dose to as low as reasonably achievable. COMPARISON: 08/19/2019 HISTORY: ORDERING SYSTEM PROVIDED HISTORY: Left flank and CVA tenderness, history of nephrolithiasis, rule out obstructive uropathy or pyelonephritis TECHNOLOGIST PROVIDED HISTORY: Reason for exam:->Left flank and CVA tenderness, history of nephrolithiasis, rule out obstructive uropathy or pyelonephritis Additional Contrast?->None Decision Support Exception->Emergency Medical Condition (MA) Is the patient pregnant?->No Reason for Exam: Left flank and CVA tenderness, history of nephrolithiasis, rule out obstructive uropathy or pyelonephritis Acuity: Acute Type of Exam: Initial Relevant Medical/Surgical History: Hx Hernia repair FINDINGS: Lower Chest: No parenchymal infiltrate, pleural effusion, pneumothorax, or pulmonary edema. No cardiomegaly. No pericardial effusion is identified. Organs: No hypodense mass identified within the liver or spleen. The gallbladder appears unremarkable. No adrenal mass is identified. No pancreatic mass. No peripancreatic inflammatory process is identified. Nonobstructive right urinary tract calculi are identified.   No ureteral calculi. No periureteral stranding is identified. GI/Bowel: Visualized loops of bowel are normal in caliber. No ileus or obstruction is identified. Normal appendix. No diverticulitis is identified. Pelvis: No pelvic mass is identified. The bladder appears unremarkable. Uterus and adnexal regions appear grossly unremarkable. No bulky pelvic lymphadenopathy is identified. Peritoneum/Retroperitoneum: No abdominal aortic aneurysm is identified. No retroperitoneal or mesenteric lymphadenopathy is identified. Small fat containing umbilical hernia. Bones/Soft Tissues: No acute subcutaneous soft tissue abnormality is identified. No acute osseous abnormality is identified. Small nonobstructive punctate calculi within the right kidney. No left-sided renal calculi. No ureteral calculi or hydroureteronephrosis within the right or left kidney. No acute inflammatory process seen in the abdomen or pelvis. Medical decision making:  Patient presents to the emergency department with left flank pain clinically concerning for pyelonephritis. CT scan shows some intrarenal colliculi, but there is no evidence of obstructive uropathy. Consider SIRS and sepsis, but there has been no hemodynamic instability or acute change in renal function. There has been no intractable vomiting or fever. There has been no respiratory distress, hypoxia, or cyanosis. Alternative diagnosis such as fracture or malalignment, acute nerve entrapment, appendicitis, or other surgical crisis appear less likely. Procedures: None. Consultations: None. Clinical Impression:  1. Pyelonephritis    2. Left flank pain    3.  Nephrolithiasis      Disposition referral (if applicable):  Beba Laws MD  [address] [place] [address]  [phone]    Schedule an appointment as soon as possible for a visit       [place] [address] [address]  [phone]  Go to

## 2021-03-02 ENCOUNTER — HOSPITAL ENCOUNTER (EMERGENCY)
Age: 36
Discharge: HOME | End: 2021-03-02
Attending: EMERGENCY MEDICINE
Payer: COMMERCIAL

## 2021-03-02 ENCOUNTER — TELEPHONE (OUTPATIENT)
Dept: GASTROENTEROLOGY | Age: 36
End: 2021-03-02

## 2021-03-02 VITALS
OXYGEN SATURATION: 99 % | RESPIRATION RATE: 16 BRPM | TEMPERATURE: 97 F | HEIGHT: 70 IN | SYSTOLIC BLOOD PRESSURE: 149 MMHG | DIASTOLIC BLOOD PRESSURE: 94 MMHG | BODY MASS INDEX: 25.77 KG/M2 | WEIGHT: 180 LBS | HEART RATE: 70 BPM

## 2021-03-02 DIAGNOSIS — L03.012 PARONYCHIA OF FINGER OF LEFT HAND: Primary | ICD-10-CM

## 2021-03-02 PROCEDURE — 10060 I&D ABSCESS SIMPLE/SINGLE: CPT

## 2021-03-02 PROCEDURE — 99283 EMERGENCY DEPT VISIT LOW MDM: CPT

## 2021-03-02 RX ORDER — MUPIROCIN CALCIUM 20 MG/G
CREAM TOPICAL
Qty: 1 TUBE | Refills: 0 | Status: SHIPPED | OUTPATIENT
Start: 2021-03-02 | End: 2021-04-01

## 2021-03-02 RX ORDER — DOCUSATE SODIUM 100 MG/1
100 CAPSULE, LIQUID FILLED ORAL 2 TIMES DAILY
Qty: 60 CAPSULE | Refills: 3 | Status: SHIPPED | OUTPATIENT
Start: 2021-03-02 | End: 2021-07-10

## 2021-03-02 RX ORDER — DOCUSATE SODIUM 100 MG/1
1 CAPSULE, LIQUID FILLED ORAL 2 TIMES DAILY
COMMUNITY
Start: 2020-12-26 | End: 2021-03-02 | Stop reason: SDUPTHER

## 2021-03-02 ASSESSMENT — PAIN SCALES - GENERAL: PAINLEVEL_OUTOF10: 5

## 2021-03-02 NOTE — ED PROVIDER NOTES
Triage Chief Complaint:   Hand Pain (PAIN TO LEFT MIDDLE FINGER AROUND NAILBED. NKI)    [place]:  Ralph Dumont is a 37 y.o. female that presents with pain to her left middle finger that she noticed today. It is swollen near her left fingernail and warm to the touch. She denies any injury to this area. No fevers. No numbness, tingling or weakness. She states she does not bite her nails. She states she initially thought she had a hangnail but was unable to find it. ROS:   Review of Systems   Constitutional: Negative for chills and fever. Respiratory: Negative for cough and shortness of breath. Cardiovascular: Negative for chest pain. Musculoskeletal: Positive for myalgias (left distal middle finger). Negative for joint swelling. Skin: Positive for rash (erythema to distal left middle finger). Negative for wound. Neurological: Negative for weakness and numbness. Psychiatric/Behavioral: Negative. Past Medical History:   Diagnosis Date    Abdominal hernia     Arthritis     Chronic back pain     no specific injury    Chronic migraine w/o aura, not intractable, w stat migr     History of kidney stones     \"had kidney stones age 29- passed it\"    Hypertension     TMJ (dislocation of temporomandibular joint)      Past Surgical History:   Procedure Laterality Date     SECTION  2006, ( with BPS)    COLONOSCOPY  2018    internal grade 1 hemorrhoids    ENDOSCOPY, COLON, DIAGNOSTIC  2018    multiple biopsiesto rule out h pylori and sprue, mild erythematous found in antrum.     HERNIA REPAIR  2018    Repair incarcerated epigastric hernia     Family History   Problem Relation Age of Onset    High Blood Pressure Mother     No Known Problems Sister     Breast Cancer Maternal Aunt     Cancer Maternal Grandmother     Diabetes Maternal Grandmother     No Known Problems Sister      Social History     Socioeconomic History    Marital status:  Spouse name: Not on file    Number of children: Not on file    Years of education: Not on file    Highest education level: Not on file   Occupational History    Not on file   Social Needs    Financial resource strain: Not on file    Food insecurity     Worry: Not on file     Inability: Not on file    Transportation needs     Medical: Not on file     Non-medical: Not on file   Tobacco Use    Smoking status: Never Smoker    Smokeless tobacco: Never Used   Substance and Sexual Activity    Alcohol use: No    Drug use: No    Sexual activity: Yes     Partners: Male     Comment: stable relationship for 11 years,  3/25/2017   Lifestyle    Physical activity     Days per week: Not on file     Minutes per session: Not on file    Stress: Not on file   Relationships    Social connections     Talks on phone: Not on file     Gets together: Not on file     Attends Taoism service: Not on file     Active member of club or organization: Not on file     Attends meetings of clubs or organizations: Not on file     Relationship status: Not on file    Intimate partner violence     Fear of current or ex partner: Not on file     Emotionally abused: Not on file     Physically abused: Not on file     Forced sexual activity: Not on file   Other Topics Concern    Not on file   Social History Narrative    Not on file     No current facility-administered medications for this encounter. Current Outpatient Medications   Medication Sig Dispense Refill    mupirocin (BACTROBAN) 2 % cream Apply topically 3 times daily.  1 Tube 0     MG capsule Take 1 capsule by mouth 2 times daily 60 capsule 3    ondansetron (ZOFRAN-ODT) 4 MG disintegrating tablet Take 1 tablet by mouth 3 times daily as needed for Nausea or Vomiting 21 tablet 0    Plecanatide 3 MG TABS Take 1 tablet by mouth daily 30 tablet 5    omeprazole (PRILOSEC) 20 MG delayed release capsule Take 1 capsule by mouth 2 times daily 60 capsule 5    SUMAtriptan (IMITREX) 50 MG tablet Take 100 mg by mouth as needed       tiZANidine (ZANAFLEX) 4 MG tablet Take 4 mg by mouth nightly       topiramate (TOPAMAX) 100 MG tablet Take 100 mg by mouth 2 times daily      propranolol (INDERAL LA) 120 MG extended release capsule Take 160 mg by mouth daily      gabapentin (NEURONTIN) 600 MG tablet Take 800 mg by mouth 3 times daily. Allergies   Allergen Reactions    Tramadol        Nursing Notes Reviewed     Physical Exam:   ED Triage Vitals [03/02/21 1423]   Enc Vitals Group      BP (!) 149/94      Pulse 70      Resp 16      Temp 97 °F (36.1 °C)      Temp Source Infrared      SpO2 99 %      Weight 180 lb (81.6 kg)      Height 5' 10\" (1.778 m)      Head Circumference       Peak Flow       Pain Score       Pain Loc       Pain Edu? Excl. in [address]? BP (!) 149/94   Pulse 70   Temp 97 °F (36.1 °C) (Infrared)   Resp 16   Ht 5' 10\" (1.778 m)   Wt 180 lb (81.6 kg)   LMP 02/20/2021   SpO2 99%   BMI 25.83 kg/m²   My pulse ox interpretation is - normal  Physical Exam  Constitutional:       General: She is not in acute distress. Appearance: Normal appearance. She is not ill-appearing or toxic-appearing. HENT:      Head: Normocephalic and atraumatic. Cardiovascular:      Rate and Rhythm: Normal rate and regular rhythm. Pulses:           Radial pulses are 2+ on the left side. Pulmonary:      Effort: Pulmonary effort is normal. No respiratory distress. Musculoskeletal:      Left hand: She exhibits tenderness and swelling. She exhibits normal capillary refill. Normal sensation noted. Decreased sensation is not present in the ulnar distribution, is not present in the medial redistribution and is not present in the radial distribution. Normal strength noted. Hands:    Skin:     Comments: Erythema and swelling to distal left middle finger as above     Neurological:      Mental Status: She is alert.       Comments: Normal sensation and strength of left hand and left middle finger as noted above   Psychiatric:         Mood and Affect: Mood normal.         Behavior: Behavior normal.         I have reviewed and interpreted all of the currently available lab results from this visit (if applicable):  No results found for this visit on 03/02/21. Radiographs (if obtained):  [] The following radiograph was interpreted by myself in the absence of a radiologist:  [x]Radiologist's Report Reviewed:  No orders to display         EKG (if obtained): (All EKG's are interpreted by myself in the absence of a cardiologist)      MDM:  Differential diagnoses considered include but are not limited to paronychia, cellulitis, herpetic kylee, hangnail. Patient's exam is consistent with a paronychia. Area was incised as noted in procedure note below with successful release of purulent material.  We will treat patient with topical antibiotics and have her follow-up closely with her primary care physician for wound recheck. We will discharge her home in stable condition. She was given strict return precautions. Plan of care explained to patient. Concerning signs and symptoms warranting a return visit to the Emergency Department were explained in detail. All questions and concerns were addressed to the patient's satisfaction. Patient understood and agreed with plan. I did don appropriate PPE (including N95 face mask, protective eye ware/safety glasses, gloves, hair covering, and no isolation gown), as recommended by the health facility/national standard best practice, during my bedside interactions with the patient. The likelihood of other entities in the differential is insufficient to justify any further testing for them. This was explained to the patient. The patient was advised that persistent or worsening symptoms would requirefurther evaluation. Clinical Impression:  1.  Paronychia of finger of left hand          Tova Robles MD       Please note that portions of this note may have been complete with a voice recognition program.  Effortswere made to edit the dictations, but occasional words are mis-transcribed. Procedure Note - Incision and Drainage:  Questions were sought and answered and verbal consent was given by patient for the procedure. The area was prepped and draped in a standard bedside fashion. The wound area was not anesthetized. An 18 gauge needle was used to make a 3mm incision over the area of maximum fluctulance. Small amount of purulent discharge expelled. . The patient tolerated the procedure well without complications. Abscess and wound care education was provided.         Loli Delcid MD  03/07/21 [id number]

## 2021-03-07 ASSESSMENT — ENCOUNTER SYMPTOMS
COUGH: 0
SHORTNESS OF BREATH: 0

## 2021-04-06 ENCOUNTER — HOSPITAL ENCOUNTER (OUTPATIENT)
Age: 36
Setting detail: SPECIMEN
Discharge: HOME | End: 2021-04-06
Payer: COMMERCIAL

## 2021-04-06 ENCOUNTER — HOSPITAL ENCOUNTER (OUTPATIENT)
Dept: GENERAL RADIOLOGY | Age: 36
Discharge: HOME | End: 2021-04-06
Payer: COMMERCIAL

## 2021-04-06 ENCOUNTER — HOSPITAL ENCOUNTER (OUTPATIENT)
Age: 36
Discharge: HOME | End: 2021-04-06
Payer: COMMERCIAL

## 2021-04-06 DIAGNOSIS — R06.02 SHORTNESS OF BREATH: ICD-10-CM

## 2021-04-06 DIAGNOSIS — R10.9 ABDOMINAL PAIN, UNSPECIFIED ABDOMINAL LOCATION: ICD-10-CM

## 2021-04-06 LAB
BACTERIA: ABNORMAL /HPF
BILIRUBIN URINE: NEGATIVE MG/DL
BLOOD, URINE: NEGATIVE
CLARITY: CLEAR
COLOR: YELLOW
GLUCOSE, URINE: NEGATIVE MG/DL
KETONES, URINE: NEGATIVE MG/DL
LEUKOCYTE ESTERASE, URINE: NEGATIVE
NITRITE URINE, QUANTITATIVE: NEGATIVE
PH, URINE: 6 (ref 5–8)
PROTEIN UA: NEGATIVE MG/DL
RBC URINE: 1 /HPF (ref 0–6)
SPECIFIC GRAVITY UA: 1.01 (ref 1–1.03)
SQUAMOUS EPITHELIAL: <1 /HPF
TRICHOMONAS: ABNORMAL /HPF
UROBILINOGEN, URINE: NEGATIVE MG/DL (ref 0.2–1)
WBC UA: 2 /HPF (ref 0–5)

## 2021-04-06 PROCEDURE — 81001 URINALYSIS AUTO W/SCOPE: CPT

## 2021-04-06 PROCEDURE — 87086 URINE CULTURE/COLONY COUNT: CPT

## 2021-04-06 PROCEDURE — 71046 X-RAY EXAM CHEST 2 VIEWS: CPT

## 2021-04-06 PROCEDURE — 74019 RADEX ABDOMEN 2 VIEWS: CPT

## 2021-04-07 LAB
CULTURE: NORMAL
Lab: NORMAL
SPECIMEN: NORMAL

## 2021-07-10 ENCOUNTER — HOSPITAL ENCOUNTER (EMERGENCY)
Age: 36
Discharge: HOME | End: 2021-07-10
Attending: EMERGENCY MEDICINE
Payer: COMMERCIAL

## 2021-07-10 VITALS
TEMPERATURE: 97.5 F | OXYGEN SATURATION: 98 % | BODY MASS INDEX: 26.69 KG/M2 | DIASTOLIC BLOOD PRESSURE: 87 MMHG | HEART RATE: 82 BPM | SYSTOLIC BLOOD PRESSURE: 132 MMHG | RESPIRATION RATE: 16 BRPM | WEIGHT: 186 LBS

## 2021-07-10 DIAGNOSIS — Z86.69 H/O MIGRAINE: ICD-10-CM

## 2021-07-10 DIAGNOSIS — R51.9 NONINTRACTABLE EPISODIC HEADACHE, UNSPECIFIED HEADACHE TYPE: Primary | ICD-10-CM

## 2021-07-10 PROCEDURE — 6360000002 HC RX W HCPCS: Performed by: EMERGENCY MEDICINE

## 2021-07-10 PROCEDURE — 2580000003 HC RX 258: Performed by: EMERGENCY MEDICINE

## 2021-07-10 PROCEDURE — 99283 EMERGENCY DEPT VISIT LOW MDM: CPT

## 2021-07-10 PROCEDURE — 96375 TX/PRO/DX INJ NEW DRUG ADDON: CPT

## 2021-07-10 PROCEDURE — 96374 THER/PROPH/DIAG INJ IV PUSH: CPT

## 2021-07-10 RX ORDER — 0.9 % SODIUM CHLORIDE 0.9 %
1000 INTRAVENOUS SOLUTION INTRAVENOUS ONCE
Status: COMPLETED | OUTPATIENT
Start: 2021-07-10 | End: 2021-07-10

## 2021-07-10 RX ORDER — ONDANSETRON 2 MG/ML
4 INJECTION INTRAMUSCULAR; INTRAVENOUS EVERY 30 MIN PRN
Status: DISCONTINUED | OUTPATIENT
Start: 2021-07-10 | End: 2021-07-10 | Stop reason: HOSPADM

## 2021-07-10 RX ORDER — KETOROLAC TROMETHAMINE 30 MG/ML
30 INJECTION, SOLUTION INTRAMUSCULAR; INTRAVENOUS ONCE
Status: COMPLETED | OUTPATIENT
Start: 2021-07-10 | End: 2021-07-10

## 2021-07-10 RX ORDER — DIPHENHYDRAMINE HYDROCHLORIDE 50 MG/ML
25 INJECTION INTRAMUSCULAR; INTRAVENOUS ONCE
Status: COMPLETED | OUTPATIENT
Start: 2021-07-10 | End: 2021-07-10

## 2021-07-10 RX ADMIN — DIPHENHYDRAMINE HYDROCHLORIDE 25 MG: 50 INJECTION INTRAMUSCULAR; INTRAVENOUS at 08:49

## 2021-07-10 RX ADMIN — SODIUM CHLORIDE 1000 ML: 9 INJECTION, SOLUTION INTRAVENOUS at 08:47

## 2021-07-10 RX ADMIN — KETOROLAC TROMETHAMINE 30 MG: 30 INJECTION, SOLUTION INTRAMUSCULAR; INTRAVENOUS at 08:48

## 2021-07-10 RX ADMIN — ONDANSETRON 4 MG: 2 INJECTION INTRAMUSCULAR; INTRAVENOUS at 08:50

## 2021-07-10 ASSESSMENT — ENCOUNTER SYMPTOMS
APNEA: 0
SHORTNESS OF BREATH: 0
COLOR CHANGE: 0
VOICE CHANGE: 0
STRIDOR: 0
ABDOMINAL PAIN: 0
NAUSEA: 1
PHOTOPHOBIA: 1
VOMITING: 1
EYE PAIN: 1
WHEEZING: 0
BLOOD IN STOOL: 0
EYE REDNESS: 0
CONSTIPATION: 0
TROUBLE SWALLOWING: 0
DIARRHEA: 0
CHEST TIGHTNESS: 0
EYE DISCHARGE: 0
COUGH: 0
ABDOMINAL DISTENTION: 0
RECTAL PAIN: 0
SORE THROAT: 0
RHINORRHEA: 0
BACK PAIN: 0
SINUS PRESSURE: 0

## 2021-07-10 ASSESSMENT — PAIN DESCRIPTION - FREQUENCY: FREQUENCY: CONTINUOUS

## 2021-07-10 ASSESSMENT — PAIN DESCRIPTION - DESCRIPTORS: DESCRIPTORS: HEADACHE

## 2021-07-10 ASSESSMENT — PAIN DESCRIPTION - PAIN TYPE: TYPE: ACUTE PAIN

## 2021-07-10 ASSESSMENT — PAIN SCALES - GENERAL
PAINLEVEL_OUTOF10: 10
PAINLEVEL_OUTOF10: 10

## 2021-07-10 ASSESSMENT — PAIN DESCRIPTION - LOCATION: LOCATION: HEAD

## 2021-07-10 NOTE — ED PROVIDER NOTES
Annalisa Perez is a 31year old female with a long-standing history of migraine headaches who presents to the ED with a headache that began last night around midnight. She describes the headache as dull pounding pressure located in the forehead, bilateral temples and ears, and radiating to the occiput and the back of her neck. She denies neck stiffness. She is photophobic and audiphobic. She has nausea and has vomited several times. She takes Aimovig shot once monthly for her migraines, that she states occur \"almost every day\". She also has Fioricet, gapapentin and other medications at home, but was unable to take it because of nausea and vomiting. This is not the worse headache of her life, and is typical of her migraine headaches. /87   Pulse 82   Temp 97.5 °F (36.4 °C)   Resp 16   Wt 186 lb (84.4 kg)   LMP 06/10/2021   SpO2 98%   BMI 26.69 kg/m²     I have reviewed the following from the nursing documentation:      Prior to Admission medications    Medication Sig Start Date End Date Taking? Authorizing Provider   Won Miller (AIMOVIG SC) Inject into the skin Once a month   Yes Historical Provider, MD   SUMAtriptan (IMITREX) 50 MG tablet Take 100 mg by mouth as needed    Yes Historical Provider, MD   topiramate (TOPAMAX) 100 MG tablet Take 100 mg by mouth 2 times daily   Yes Historical Provider, MD   propranolol (INDERAL LA) 120 MG extended release capsule Take 160 mg by mouth daily   Yes Historical Provider, MD   gabapentin (NEURONTIN) 600 MG tablet Take 800 mg by mouth 3 times daily.    Yes Historical Provider, MD       Allergies as of 07/10/2021 - Fully Reviewed 07/10/2021   Allergen Reaction Noted    Tramadol  01/29/2021       Past Medical History:   Diagnosis Date    Abdominal hernia     Arthritis     Chronic back pain     no specific injury    Chronic migraine w/o aura, not intractable, w stat migr     History of kidney stones     \"had kidney stones age 29- passed it\"    Family:     Attends [employer] Services:     Active Member of Clubs or Organizations:     Attends Club or Organization Meetings:     Marital Status:    Intimate Partner Violence:     Fear of Current or Ex-Partner:     Emotionally Abused:     Physically Abused:     Sexually Abused:          Review of Systems   Constitutional: Negative for activity change, appetite change, chills, diaphoresis, fatigue, fever and unexpected weight change. HENT: Positive for ear pain (bilateral ear pressure). Negative for congestion, mouth sores, rhinorrhea, sinus pressure, sore throat, tinnitus, trouble swallowing and voice change. Eyes: Positive for photophobia and pain. Negative for discharge, redness and visual disturbance. Respiratory: Negative for apnea, cough, chest tightness, shortness of breath, wheezing and stridor. Cardiovascular: Negative for chest pain, palpitations and leg swelling. Gastrointestinal: Positive for nausea and vomiting. Negative for abdominal distention, abdominal pain, blood in stool, constipation, diarrhea and rectal pain. Genitourinary: Negative for difficulty urinating, dyspareunia, dysuria, flank pain, frequency, genital sores, menstrual problem, pelvic pain, urgency, vaginal bleeding, vaginal discharge and vaginal pain. Musculoskeletal: Negative for arthralgias, back pain, joint swelling, neck pain and neck stiffness. Skin: Negative for color change and rash. Neurological: Positive for headaches. Negative for dizziness, tremors, seizures, syncope, facial asymmetry, speech difficulty, weakness, light-headedness and numbness. Hematological: Negative for adenopathy. Does not bruise/bleed easily. Psychiatric/Behavioral: Negative for agitation, confusion, dysphoric mood, hallucinations, self-injury, sleep disturbance and suicidal ideas. All other systems reviewed and are negative. Physical Exam  Constitutional:       General: She is not in acute distress. Appearance: She is well-developed. HENT:      Head: Normocephalic and atraumatic. Left Ear: External ear normal.      Mouth/Throat:      Pharynx: No oropharyngeal exudate. Eyes:      General:         Right eye: No discharge. Left eye: No discharge. Conjunctiva/sclera: Conjunctivae normal.      Pupils: Pupils are equal, round, and reactive to light. Neck:      Vascular: No JVD. Trachea: No tracheal deviation. Cardiovascular:      Rate and Rhythm: Normal rate and regular rhythm. Heart sounds: Normal heart sounds. No murmur heard. No friction rub. No gallop. Pulmonary:      Effort: Pulmonary effort is normal. No respiratory distress. Breath sounds: Normal breath sounds. No stridor. No wheezing or rales. Chest:      Chest wall: No tenderness. Abdominal:      General: Bowel sounds are normal. There is no distension. Palpations: Abdomen is soft. There is no mass. Tenderness: There is no abdominal tenderness. There is no guarding or rebound. Musculoskeletal:         General: No tenderness. Normal range of motion. Cervical back: Normal range of motion and neck supple. No rigidity. Lymphadenopathy:      Cervical: No cervical adenopathy. Skin:     Findings: No rash. Neurological:      Mental Status: She is alert and oriented to person, place, and time. GCS: GCS eye subscore is 4. GCS verbal subscore is 5. GCS motor subscore is 6. Cranial Nerves: Cranial nerves are intact. No cranial nerve deficit. Sensory: Sensation is intact. Motor: Motor function is intact. No abnormal muscle tone. Coordination: Coordination is intact. Coordination normal.      Gait: Gait is intact. Deep Tendon Reflexes: Reflexes normal.      Reflex Scores:       Bicep reflexes are 2+ on the right side and 2+ on the left side. Patellar reflexes are 2+ on the right side and 2+ on the left side.      Comments: Coordination, gait, speech, balance and cognition are intact. There is no nuchal rigidity or evidence of meningismus. Negative Kernig's and Brudzinski's signs. All sensory and motor components of the brachial/lumbosacral plexus tested are symmetric and intact. No focal deficits appreciated. Psychiatric:         Behavior: Behavior normal.         Thought Content: Thought content normal.         Judgment: Judgment normal.          Procedures     MDM   No results found for this visit on 07/10/21. I estimate there is LOW risk for SUBARACHNOID HEMORRHAGE, MENINGITIS, INTRACRANIAL HEMORRHAGE, SUBDURAL HEMATOMA, OR STROKE, thus I consider the discharge disposition reasonable. [facility] and I have discussed the diagnosis and risks, and we agree with discharging home to follow-up with their primary doctor. We also discussed returning to the Emergency Department immediately if new or worsening symptoms occur. We have discussed the symptoms which are most concerning (e.g., changing or worsening pain, weakness, vomiting, fever) that necessitate immediate return. Final Impression    1. Nonintractable episodic headache, unspecified headache type    2. H/O migraine        Discharge Vital Signs:  Blood pressure 132/87, pulse 82, temperature 97.5 °F (36.4 °C), resp. rate 16, weight 186 lb (84.4 kg), last menstrual period 06/10/2021, SpO2 98 %, not currently breastfeeding. Recheck 9:45 am  Headache improved. No adverse reaction to medications. IV infusing. She feels she is ready for discharge.       Khushbu Perez MD  07/10/21 [id number]

## 2021-07-10 NOTE — LETTER
[address] [facility] [place] [address]  Phone: [phone]  Fax: [phone]             July 10, 2021    Patient: Kimberley Ibarra   YOB: 1985   Date of Visit: 7/10/2021       To Whom It May Concern:    Maria Esther Lomeli was seen and treated in our emergency department on 7/10/2021. She may return to work on 7/11/2021.       Sincerely,       Lali Fontanez RN       Signature:__________________________________

## 2021-07-10 NOTE — ED NOTES
Discharge instructions given  verbalized understanding pt is ambulatory out       Kaylee Cabezas RN  07/10/21 0754

## 2021-07-10 NOTE — ED NOTES
Pt ambulatory to bathroom   Headache better Dr Becky Kent notified     Mariam Hinojosa RN  07/10/21 7159

## 2021-07-30 ENCOUNTER — HOSPITAL ENCOUNTER (OUTPATIENT)
Age: 36
Discharge: HOME | End: 2021-07-30
Payer: COMMERCIAL

## 2021-07-30 LAB
ALBUMIN SERPL-MCNC: 4.4 GM/DL (ref 3.4–5)
ALP BLD-CCNC: 93 IU/L (ref 40–129)
ALT SERPL-CCNC: 12 U/L (ref 10–40)
ANION GAP SERPL CALCULATED.3IONS-SCNC: 9 MMOL/L (ref 4–16)
AST SERPL-CCNC: 12 IU/L (ref 15–37)
BACTERIA: NEGATIVE /HPF
BASOPHILS ABSOLUTE: 0.1 K/CU MM
BASOPHILS RELATIVE PERCENT: 0.7 % (ref 0–1)
BILIRUB SERPL-MCNC: 0.8 MG/DL (ref 0–1)
BILIRUBIN DIRECT: 0.2 MG/DL (ref 0–0.3)
BILIRUBIN URINE: NEGATIVE MG/DL
BILIRUBIN, INDIRECT: 0.6 MG/DL (ref 0–0.7)
BLOOD, URINE: NEGATIVE
BUN BLDV-MCNC: 9 MG/DL (ref 6–23)
CALCIUM SERPL-MCNC: 9 MG/DL (ref 8.3–10.6)
CHLORIDE BLD-SCNC: 104 MMOL/L (ref 99–110)
CHOLESTEROL: 167 MG/DL
CLARITY: CLEAR
CO2: 26 MMOL/L (ref 21–32)
COLOR: YELLOW
CREAT SERPL-MCNC: 0.8 MG/DL (ref 0.6–1.1)
DIFFERENTIAL TYPE: ABNORMAL
EOSINOPHILS ABSOLUTE: 0.2 K/CU MM
EOSINOPHILS RELATIVE PERCENT: 2.1 % (ref 0–3)
ERYTHROCYTE SEDIMENTATION RATE: 11 MM/HR (ref 0–20)
ESTIMATED AVERAGE GLUCOSE: 97 MG/DL
FERRITIN: 35 NG/ML (ref 15–150)
GFR AFRICAN AMERICAN: >60 ML/MIN/1.73M2
GFR NON-AFRICAN AMERICAN: >60 ML/MIN/1.73M2
GLUCOSE BLD-MCNC: 90 MG/DL (ref 70–99)
GLUCOSE, URINE: NEGATIVE MG/DL
HBA1C MFR BLD: 5 % (ref 4.2–6.3)
HCT VFR BLD CALC: 40.1 % (ref 37–47)
HDLC SERPL-MCNC: 44 MG/DL
HEMOGLOBIN: 13 GM/DL (ref 12.5–16)
IMMATURE NEUTROPHIL %: 0.5 % (ref 0–0.43)
IRON: 57 UG/DL (ref 37–145)
KETONES, URINE: NEGATIVE MG/DL
LDL CHOLESTEROL DIRECT: 109 MG/DL
LEUKOCYTE ESTERASE, URINE: NEGATIVE
LYMPHOCYTES ABSOLUTE: 3.2 K/CU MM
LYMPHOCYTES RELATIVE PERCENT: 35.6 % (ref 24–44)
MAGNESIUM: 2.1 MG/DL (ref 1.8–2.4)
MCH RBC QN AUTO: 29.5 PG (ref 27–31)
MCHC RBC AUTO-ENTMCNC: 32.4 % (ref 32–36)
MCV RBC AUTO: 90.9 FL (ref 78–100)
MONOCYTES ABSOLUTE: 0.6 K/CU MM
MONOCYTES RELATIVE PERCENT: 6.8 % (ref 0–4)
MUCUS: ABNORMAL HPF
NITRITE URINE, QUANTITATIVE: NEGATIVE
NUCLEATED RBC %: 0 %
PCT TRANSFERRIN: 20 % (ref 10–44)
PDW BLD-RTO: 12.7 % (ref 11.7–14.9)
PH, URINE: 5 (ref 5–8)
PLATELET # BLD: 374 K/CU MM (ref 140–440)
PMV BLD AUTO: 10 FL (ref 7.5–11.1)
POTASSIUM SERPL-SCNC: 4.1 MMOL/L (ref 3.5–5.1)
PROTEIN UA: NEGATIVE MG/DL
RBC # BLD: 4.41 M/CU MM (ref 4.2–5.4)
RBC URINE: 1 /HPF (ref 0–6)
SEGMENTED NEUTROPHILS ABSOLUTE COUNT: 4.8 K/CU MM
SEGMENTED NEUTROPHILS RELATIVE PERCENT: 54.3 % (ref 36–66)
SODIUM BLD-SCNC: 139 MMOL/L (ref 135–145)
SPECIFIC GRAVITY UA: 1.02 (ref 1–1.03)
SQUAMOUS EPITHELIAL: 1 /HPF
T4 FREE: 1.74 NG/DL (ref 0.9–1.8)
TOTAL IMMATURE NEUTOROPHIL: 0.04 K/CU MM
TOTAL IRON BINDING CAPACITY: 289 UG/DL (ref 250–450)
TOTAL NUCLEATED RBC: 0 K/CU MM
TOTAL PROTEIN: 6.6 GM/DL (ref 6.4–8.2)
TRICHOMONAS: ABNORMAL /HPF
TRIGL SERPL-MCNC: 83 MG/DL
TSH HIGH SENSITIVITY: 0.9 UIU/ML (ref 0.27–4.2)
UNSATURATED IRON BINDING CAPACITY: 232 UG/DL (ref 110–370)
URIC ACID: 3.8 MG/DL (ref 2.6–6)
UROBILINOGEN, URINE: NEGATIVE MG/DL (ref 0.2–1)
VITAMIN D 25-HYDROXY: 7.85 NG/ML
WBC # BLD: 8.9 K/CU MM (ref 4–10.5)
WBC UA: 1 /HPF (ref 0–5)

## 2021-07-30 PROCEDURE — 80061 LIPID PANEL: CPT

## 2021-07-30 PROCEDURE — 83550 IRON BINDING TEST: CPT

## 2021-07-30 PROCEDURE — 83721 ASSAY OF BLOOD LIPOPROTEIN: CPT

## 2021-07-30 PROCEDURE — 84550 ASSAY OF BLOOD/URIC ACID: CPT

## 2021-07-30 PROCEDURE — 83036 HEMOGLOBIN GLYCOSYLATED A1C: CPT

## 2021-07-30 PROCEDURE — 85652 RBC SED RATE AUTOMATED: CPT

## 2021-07-30 PROCEDURE — 84443 ASSAY THYROID STIM HORMONE: CPT

## 2021-07-30 PROCEDURE — 85025 COMPLETE CBC W/AUTO DIFF WBC: CPT

## 2021-07-30 PROCEDURE — 84439 ASSAY OF FREE THYROXINE: CPT

## 2021-07-30 PROCEDURE — 81001 URINALYSIS AUTO W/SCOPE: CPT

## 2021-07-30 PROCEDURE — 83540 ASSAY OF IRON: CPT

## 2021-07-30 PROCEDURE — 83735 ASSAY OF MAGNESIUM: CPT

## 2021-07-30 PROCEDURE — 80053 COMPREHEN METABOLIC PANEL: CPT

## 2021-07-30 PROCEDURE — 82306 VITAMIN D 25 HYDROXY: CPT

## 2021-07-30 PROCEDURE — 82248 BILIRUBIN DIRECT: CPT

## 2021-07-30 PROCEDURE — 82728 ASSAY OF FERRITIN: CPT

## 2021-07-30 PROCEDURE — 36415 COLL VENOUS BLD VENIPUNCTURE: CPT

## 2021-12-04 ENCOUNTER — HOSPITAL ENCOUNTER (OUTPATIENT)
Age: 36
Discharge: HOME | End: 2021-12-04
Payer: COMMERCIAL

## 2021-12-04 ENCOUNTER — HOSPITAL ENCOUNTER (OUTPATIENT)
Dept: GENERAL RADIOLOGY | Age: 36
Discharge: HOME | End: 2021-12-04
Payer: COMMERCIAL

## 2021-12-04 DIAGNOSIS — R06.02 SHORTNESS OF BREATH: ICD-10-CM

## 2021-12-04 DIAGNOSIS — R07.9 CHEST PAIN, UNSPECIFIED TYPE: ICD-10-CM

## 2021-12-04 PROCEDURE — 71046 X-RAY EXAM CHEST 2 VIEWS: CPT

## 2021-12-14 ENCOUNTER — HOSPITAL ENCOUNTER (OUTPATIENT)
Dept: CT IMAGING | Age: 36
Discharge: HOME | End: 2021-12-14
Payer: COMMERCIAL

## 2021-12-14 DIAGNOSIS — R07.9 CHEST PAIN, UNSPECIFIED TYPE: ICD-10-CM

## 2021-12-14 DIAGNOSIS — R06.02 SHORTNESS OF BREATH: ICD-10-CM

## 2021-12-14 LAB
GFR AFRICAN AMERICAN: >60 ML/MIN/1.73M2
GFR NON-AFRICAN AMERICAN: >60 ML/MIN/1.73M2
POC CREATININE: 1 MG/DL (ref 0.6–1.1)

## 2021-12-14 PROCEDURE — 6360000004 HC RX CONTRAST MEDICATION: Performed by: FAMILY MEDICINE

## 2021-12-14 PROCEDURE — 71275 CT ANGIOGRAPHY CHEST: CPT

## 2021-12-14 RX ORDER — SODIUM CHLORIDE 0.9 % (FLUSH) 0.9 %
5-40 SYRINGE (ML) INJECTION PRN
Status: DISCONTINUED | OUTPATIENT
Start: 2021-12-14 | End: 2021-12-15 | Stop reason: HOSPADM

## 2021-12-14 RX ADMIN — IOPAMIDOL 95 ML: 755 INJECTION, SOLUTION INTRAVENOUS at 12:15

## 2022-03-01 ENCOUNTER — HOSPITAL ENCOUNTER (OUTPATIENT)
Age: 37
Discharge: HOME | End: 2022-03-01
Payer: COMMERCIAL

## 2022-03-01 ENCOUNTER — HOSPITAL ENCOUNTER (OUTPATIENT)
Dept: GENERAL RADIOLOGY | Age: 37
Discharge: HOME | End: 2022-03-01
Payer: COMMERCIAL

## 2022-03-01 DIAGNOSIS — R10.9 STOMACH ACHE: ICD-10-CM

## 2022-03-01 LAB
BACTERIA: ABNORMAL /HPF
BILIRUBIN URINE: NEGATIVE MG/DL
BLOOD, URINE: ABNORMAL
CLARITY: CLEAR
COLOR: YELLOW
GLUCOSE, URINE: NEGATIVE MG/DL
KETONES, URINE: NEGATIVE MG/DL
LEUKOCYTE ESTERASE, URINE: NEGATIVE
MUCUS: ABNORMAL HPF
NITRITE URINE, QUANTITATIVE: NEGATIVE
PH, URINE: 6 (ref 5–8)
PROTEIN UA: NEGATIVE MG/DL
RBC URINE: 3 /HPF (ref 0–6)
SPECIFIC GRAVITY UA: >1.03 (ref 1–1.03)
SQUAMOUS EPITHELIAL: 12 /HPF
UROBILINOGEN, URINE: NORMAL MG/DL (ref 0.2–1)
WBC UA: ABNORMAL /HPF (ref 0–5)

## 2022-03-01 PROCEDURE — 87086 URINE CULTURE/COLONY COUNT: CPT

## 2022-03-01 PROCEDURE — 74019 RADEX ABDOMEN 2 VIEWS: CPT

## 2022-03-01 PROCEDURE — 81001 URINALYSIS AUTO W/SCOPE: CPT

## 2022-03-02 LAB
CULTURE: NORMAL
Lab: NORMAL
SPECIMEN: NORMAL

## 2022-03-17 ENCOUNTER — OFFICE VISIT (OUTPATIENT)
Dept: GASTROENTEROLOGY | Age: 37
End: 2022-03-17
Payer: COMMERCIAL

## 2022-03-17 VITALS
BODY MASS INDEX: 26.63 KG/M2 | OXYGEN SATURATION: 98 % | DIASTOLIC BLOOD PRESSURE: 78 MMHG | SYSTOLIC BLOOD PRESSURE: 114 MMHG | HEART RATE: 76 BPM | TEMPERATURE: 97.2 F | WEIGHT: 186 LBS | HEIGHT: 70 IN

## 2022-03-17 DIAGNOSIS — K59.04 CHRONIC IDIOPATHIC CONSTIPATION: Primary | ICD-10-CM

## 2022-03-17 DIAGNOSIS — K21.9 GASTROESOPHAGEAL REFLUX DISEASE WITHOUT ESOPHAGITIS: ICD-10-CM

## 2022-03-17 PROCEDURE — G8427 DOCREV CUR MEDS BY ELIG CLIN: HCPCS | Performed by: NURSE PRACTITIONER

## 2022-03-17 PROCEDURE — G8419 CALC BMI OUT NRM PARAM NOF/U: HCPCS | Performed by: NURSE PRACTITIONER

## 2022-03-17 PROCEDURE — 1036F TOBACCO NON-USER: CPT | Performed by: NURSE PRACTITIONER

## 2022-03-17 PROCEDURE — G8484 FLU IMMUNIZE NO ADMIN: HCPCS | Performed by: NURSE PRACTITIONER

## 2022-03-17 PROCEDURE — 99213 OFFICE O/P EST LOW 20 MIN: CPT | Performed by: NURSE PRACTITIONER

## 2022-03-17 RX ORDER — ONDANSETRON 4 MG/1
TABLET, ORALLY DISINTEGRATING ORAL
COMMUNITY
Start: 2022-01-19

## 2022-03-17 RX ORDER — OMEPRAZOLE 20 MG/1
20 CAPSULE, DELAYED RELEASE ORAL DAILY
Qty: 30 CAPSULE | Refills: 5 | Status: SHIPPED | OUTPATIENT
Start: 2022-03-17 | End: 2022-09-16 | Stop reason: SDUPTHER

## 2022-03-17 RX ORDER — HYDROCODONE BITARTRATE AND ACETAMINOPHEN 5; 325 MG/1; MG/1
TABLET ORAL
COMMUNITY
Start: 2022-01-03

## 2022-03-17 RX ORDER — RIMEGEPANT SULFATE 75 MG/75MG
TABLET, ORALLY DISINTEGRATING ORAL
COMMUNITY
Start: 2022-02-25

## 2022-03-17 RX ORDER — ZOLPIDEM TARTRATE 10 MG/1
TABLET ORAL
COMMUNITY
Start: 2022-03-04

## 2022-03-17 NOTE — PROGRESS NOTES
Kassandra Arthur 31 y.o. female was seen by TIMO Milan on 3/17/2022     Wt Readings from Last 3 Encounters:   03/17/22 186 lb (84.4 kg)   07/10/21 186 lb (84.4 kg)   03/02/21 180 lb (81.6 kg)       FELISHA Diaz is a pleasant 31 y.o.  female who presents today for follow-up on acid reflux and constipation. Her EGD/colonoscopy were done by Dr. Blane Garibay on 9-. Her EGD revealed normal esophagus, mildly erythematous mucosa in the stomach antrum and normal duodenum.  Her biopsies revealed mild chronic inactive gastritis that were negative for H. Pylori and Celiac Sprue.  Her colonoscopy revealed internal grade 1 hemorrhoids and normal colon.  Her colon biopsies revealed no evidence of microscopic colitis or dysplasia. She mentioned Trulance is not working. Her bowels are moving once a week with hard small stool to pellet like stools. No diarrhea. No blood in her stools or melena. No excess belching and flatulence. Her appetite is fair and weight is stable. No abdominal pain. Intermittent bloating. Her acid reflux is controlled with Prilosec. No nocturnal awakenings with acid reflux every night. No dysphagia or pain with swallowing. Occasional nausea. Zofran helps. No vomiting. She has intermittent lower abdominal discomfort near pubic area. No family history of stomach or colon cancer.     ROS  Review of Systems   Constitutional: Negative for activity change, appetite change, chills, fatigue, fever and unexpected weight change. HENT: Negative for ear pain, hearing loss and tinnitus. Eyes: Negative for pain and visual disturbance. Respiratory: Negative for cough, shortness of breath and wheezing. Cardiovascular: Negative for chest pain, palpitations and leg swelling. Gastrointestinal: Positive for constipation. Negative for abdominal pain, blood in stool, diarrhea, nausea and vomiting. Endocrine: Negative for cold intolerance and heat intolerance. Genitourinary: Negative for dysuria, frequency and urgency. Musculoskeletal: Negative for back pain, myalgias and neck pain. Skin: Negative for color change, pallor and rash. Allergic/Immunologic: Negative for environmental allergies and food allergies. Neurological: Positive for headaches. Negative for dizziness and seizures. Hematological: Bruises/bleeds easily. Psychiatric/Behavioral: Positive for sleep disturbance. Negative for dysphoric mood and suicidal ideas. The patient is not nervous/anxious. Allergies  Allergies   Allergen Reactions    Tramadol        Medications  Current Outpatient Medications   Medication Sig Dispense Refill    VENTOLIN  (90 Base) MCG/ACT inhaler INHALE 1 PUFF BY MOUTH EVERY FOUR HOURS AS NEEDED      HYDROcodone-acetaminophen (NORCO) 5-325 MG per tablet TAKE 1 TABLET BY MOUTH EVERY DAY FOR 7 DAYS      ondansetron (ZOFRAN-ODT) 4 MG disintegrating tablet DISSOLVE 1 TABLET IN MOUTH EVERY DAY      NURTEC 75 MG TBDP       zolpidem (AMBIEN) 10 MG tablet TAKE 1 TABLET BY MOUTH AT BEDTIME      Erenumab-aooe (AIMOVIG SC) Inject into the skin Once a month      SUMAtriptan (IMITREX) 50 MG tablet Take 100 mg by mouth as needed       topiramate (TOPAMAX) 100 MG tablet Take 100 mg by mouth 2 times daily      propranolol (INDERAL LA) 120 MG extended release capsule Take 160 mg by mouth daily       No current facility-administered medications for this visit. Past medical history:   She has a past medical history of Abdominal hernia, Arthritis, Chronic back pain, Chronic migraine w/o aura, not intractable, w stat migr, History of kidney stones, Hypertension, and TMJ (dislocation of temporomandibular joint). Past surgical history:  She has a past surgical history that includes  section (2006, ( with BPS)); Endoscopy, colon, diagnostic (2018); Colonoscopy (2018); and hernia repair (2018).     Social History:  She reports that she has never smoked. She has never used smokeless tobacco. She reports that she does not drink alcohol and does not use drugs. Family history:  Her family history includes Breast Cancer in her maternal aunt; Cancer in her maternal grandmother; Diabetes in her maternal grandmother; High Blood Pressure in her mother; No Known Problems in her sister and sister. Objective    Vitals:    03/17/22 1330   BP: 114/78   Pulse: 76   Temp: 97.2 °F (36.2 °C)   SpO2: 98%        Physical exam    Physical Exam  Vitals reviewed. Constitutional:       General: She is not in acute distress. Appearance: Normal appearance. She is well-developed. She is not ill-appearing, toxic-appearing or diaphoretic. HENT:      Head: Normocephalic and atraumatic. Nose: Nose normal.      Mouth/Throat:      Mouth: Mucous membranes are moist.   Eyes:      General:         Right eye: No discharge. Left eye: No discharge. Pupils: Pupils are equal, round, and reactive to light. Neck:      Trachea: No tracheal deviation. Cardiovascular:      Rate and Rhythm: Normal rate and regular rhythm. Pulses: Normal pulses. Heart sounds: Normal heart sounds. No murmur heard. No gallop. Pulmonary:      Effort: Pulmonary effort is normal. No respiratory distress. Breath sounds: Normal breath sounds. No stridor. No wheezing, rhonchi or rales. Abdominal:      General: Bowel sounds are normal. There is no distension. Palpations: Abdomen is soft. There is no mass. Tenderness: There is no abdominal tenderness. There is no guarding or rebound. Hernia: No hernia is present. Musculoskeletal:         General: Normal range of motion. Cervical back: Normal range of motion and neck supple. Skin:     General: Skin is warm and dry. Neurological:      Mental Status: She is alert and oriented to person, place, and time.    Psychiatric:         Mood and Affect: Mood normal.         Hospital Outpatient Visit on 03/01/2022   Component Date Value Ref Range Status    Color, UA 03/01/2022 YELLOW  YELLOW Final    Clarity, UA 03/01/2022 CLEAR  CLEAR Final    Glucose, Urine 03/01/2022 NEGATIVE  NEGATIVE MG/DL Final    Bilirubin Urine 03/01/2022 NEGATIVE  NEGATIVE MG/DL Final    Ketones, Urine 03/01/2022 NEGATIVE  NEGATIVE MG/DL Final    Specific [place], UA 03/01/2022 >1.030  1.001 - 1.035 Final    Comment: (NOTE)  CONSIDER URINE OSMOLARITY TEST IF CLINICALLY INDICATED        Blood, Urine 03/01/2022 SMALL* NEGATIVE Final    pH, Urine 03/01/2022 6.0  5.0 - 8.0 Final    Protein, UA 03/01/2022 NEGATIVE  NEGATIVE MG/DL Final    Urobilinogen, Urine 03/01/2022 NORMAL  0.2 - 1.0 MG/DL Final    Nitrite Urine, Quantitative 03/01/2022 NEGATIVE  NEGATIVE Final    Leukocyte Esterase, Urine 03/01/2022 NEGATIVE  NEGATIVE Final    RBC, UA 03/01/2022 3  0 - 6 /HPF Final    WBC, UA 03/01/2022 NONE SEEN  0 - 5 /HPF Final    Bacteria, UA 03/01/2022 MODERATE* NEGATIVE /HPF Final    Squam Epithel, UA 03/01/2022 12  /HPF Final    Mucus, UA 03/01/2022 FEW* NEGATIVE HPF Final    Specimen 03/01/2022 URINE CLEAN CATCH   Final    Special Requests 03/01/2022 NONE   Final    Culture 03/01/2022 Final Report <10,000 CFU/ml mixed skin/urogenital rajnith. No further workup   Final       Assessment and Plan:  1.  Constipation most likely diet related or possible irritable bowel syndrome. Trulance is not working; Miralax and stool softeners not working. Will provide with Linzess 145 mcg samples take once daily for treatment of constipation. The patient was encouraged to eat a high fiber diet, drink plenty of water, avoid foods that are constipating and exercise daily. 2.  GERD that is stable. The patient was encouraged to continue taking Prilosec twice daily for the treatment of acid reflux.  The patient was encouraged to avoid over eating, avoid foods that worsen and eating late at night.     3.  The patient was encouraged to follow-up in 3-6 months.

## 2022-09-20 RX ORDER — OMEPRAZOLE 20 MG/1
20 CAPSULE, DELAYED RELEASE ORAL DAILY
Qty: 30 CAPSULE | Refills: 5 | Status: SHIPPED | OUTPATIENT
Start: 2022-09-20

## 2022-10-03 NOTE — TELEPHONE ENCOUNTER
Please notify patient that stool softener was refilled. Also schedule follow-up apt since she has not been seen in months. Thank you. Anesthesia Type: 1% lidocaine with epinephrine

## 2022-12-28 ENCOUNTER — HOSPITAL ENCOUNTER (OUTPATIENT)
Age: 37
Setting detail: SPECIMEN
Discharge: HOME | End: 2022-12-28
Payer: COMMERCIAL

## 2022-12-28 ENCOUNTER — OFFICE VISIT (OUTPATIENT)
Dept: FAMILY MEDICINE CLINIC | Age: 37
End: 2022-12-28
Payer: COMMERCIAL

## 2022-12-28 VITALS — HEART RATE: 80 BPM | WEIGHT: 188 LBS | BODY MASS INDEX: 26.98 KG/M2 | TEMPERATURE: 97.1 F | OXYGEN SATURATION: 98 %

## 2022-12-28 DIAGNOSIS — J06.9 VIRAL URI: Primary | ICD-10-CM

## 2022-12-28 PROCEDURE — G8427 DOCREV CUR MEDS BY ELIG CLIN: HCPCS | Performed by: NURSE PRACTITIONER

## 2022-12-28 PROCEDURE — G8484 FLU IMMUNIZE NO ADMIN: HCPCS | Performed by: NURSE PRACTITIONER

## 2022-12-28 PROCEDURE — G8419 CALC BMI OUT NRM PARAM NOF/U: HCPCS | Performed by: NURSE PRACTITIONER

## 2022-12-28 PROCEDURE — 1036F TOBACCO NON-USER: CPT | Performed by: NURSE PRACTITIONER

## 2022-12-28 PROCEDURE — 99202 OFFICE O/P NEW SF 15 MIN: CPT | Performed by: NURSE PRACTITIONER

## 2022-12-28 PROCEDURE — U0005 INFEC AGEN DETEC AMPLI PROBE: HCPCS

## 2022-12-28 PROCEDURE — U0003 INFECTIOUS AGENT DETECTION BY NUCLEIC ACID (DNA OR RNA); SEVERE ACUTE RESPIRATORY SYNDROME CORONAVIRUS 2 (SARS-COV-2) (CORONAVIRUS DISEASE [COVID-19]), AMPLIFIED PROBE TECHNIQUE, MAKING USE OF HIGH THROUGHPUT TECHNOLOGIES AS DESCRIBED BY CMS-2020-01-R: HCPCS

## 2022-12-28 NOTE — PATIENT INSTRUCTIONS
COVID PCR test pending - will call you with results. Colds last on avg 7-10 days with peak in symptoms on days 3-5. Recommend symptomatic treamtent with rest, fluids, good handwashing. The following may also be helpful for symptom management:    >Sudafed 12 hr for nasal congestion. This is behind the pharmacy counter and will require that you show your license to purchase. >Benadryl 25-50mg at bedtime for nasal drainage (and may help with sleep). >Over the counter pain relievers acetaminophen and/or ibuprofen as needed and do not exceed daily amount printed on label. >Saltwater gargles, soft foods, cool liquids, over the counter cepacol throat lozenges as needed for sore throat. >Delsym as needed for cough. Follow directions on label. >1-2 tsp of honey before bedtime as needed for cough. **Antibiotics are NOT helpful and can be harmful when used to treat colds due to the possibility of unwanted side effects and the development of resistant bacteria. If you have symptoms that last for 10 days without improvement then follow-up. Return for follow-up if you develop a new fever or severe/worsening symptoms at any point . Recommend the following to enhance your immune system -    Vitamin D3 5,000 IU daily. Can continue on 2,000-5,000 IU after illness has resolved. Vitamin C 1,000mg twice daily. Can continue vitamin C 1,000-2,000mg daily for health maintenance once illness has resolved. Zinc 50mg daily. Drop down to 15-25mg daily for general respiratory virus prevention once illness has resolved.

## 2022-12-28 NOTE — LETTER
[address] [facility] [facility]  [address] [facility]  [address]  Phone: [phone]  Fax: [phone]    ELIAS Vasquez CNP        December 28, 2022     Patient: Lalito Pierre   YOB: 1985   Date of Visit: 12/28/2022       To Whom It May Concern:    Please excuse Crystal from work today 12/28 and tomorrow 12/29 due to a contagious illness. If you have any questions or concerns, please don't hesitate to call.     Sincerely,        ELIAS Vasquez CNP

## 2022-12-28 NOTE — PROGRESS NOTES
12/28/22  Kassandra Glaser  1985    FLU/COVID-19 CLINIC EVALUATION    HPI SYMPTOMS:    Employer: [address]    [x] Fevers  [] Chills  [x] Cough  [] Coughing up blood  [] Chest Congestion  [x] Nasal Congestion  [x] Feeling short of breath  [x] Sometimes  [] Frequently  [] All the time  [x] Chest tightness  [x] Headaches  []Tolerable  [x] Severe  [x] Sore throat  [] Muscle aches  [] Nausea  [] Vomiting  []Unable to keep fluids down  [] Diarrhea  []Severe    [x] OTHER SYMPTOMS: Bilateral ear pain     Symptom Duration:   [x] 1  [] 2   [] 3   [] 4    [] 5   [] 6   [] 7   [] 8   [] 9   [] 10   [] 11   [] 12   [] 13   [] 14   [] Longer than 14 days    Symptom course:   [x] Worsening     [] Stable     [] Improving    RISK FACTORS:    [] Pregnant or possibly pregnant  [] Age over 61  [] Diabetes  [] Heart disease  [] Asthma  [] COPD/Other chronic lung diseases  [] Active Cancer  [] On Chemotherapy  [] Taking oral steroids  [] History Lymphoma/Leukemia  [x] Close contact with a lab confirmed COVID-19 patient within 14 days of symptom onset (Patients)  [] History of travel from affected geographical areas within 14 days of symptom onset       VITALS:  There were no vitals filed for this visit. TESTS:    POCT FLU:  [] Positive     []Negative    ASSESSMENT:    [] Flu  [] Possible COVID-19  [] Strep    PLAN:    [] Discharge home with written instructions for:  [] Flu management  [] Possible COVID-19 infection with self-quarantine and management of symptoms  [] Follow-up with primary care physician or emergency department if worsens  [] Evaluation per physician/NP/PA in clinic  [] Sent to ER       An  electronic signature was used to authenticate this note.      --Iwona Galdamez LPN on 35/72/6476 at 9:11 AM

## 2022-12-28 NOTE — PROGRESS NOTES
Ksenia Ocampo is a 30 y.o. female. Chief Complaint   Patient presents with    URI        SUBJECTIVE:     HPI     Malathi Saunders is a 38yo new patient who presents re: upper resp symptoms x 1 day. (+)HA, fatigue, nasal congestion, sore throat, cough, chest congestion, intermittent chest tightness with shortness of breath. She also reports bilateral ear pain, no otorrhea. No fevers, chills, new rash, nausea, vomiting, diarrhea, wheezing. No cardiopulmonary disease history. Covid going around work - she works at [employer] in [place]. No covid vaccine. Rapid covid test was negative on Monday. Review of Systems  All other systems negative except what is noted in HPI. OBJECTIVE:      Pulse 80   Temp 97.1 °F (36.2 °C)   Wt 188 lb (85.3 kg)   SpO2 98%   BMI 26.98 kg/m²       Physical Exam  Constitutional:       Appearance: Normal appearance. She is not toxic-appearing. HENT:      Head: Normocephalic and atraumatic. Right Ear: Tympanic membrane, ear canal and external ear normal.      Left Ear: Ear canal and external ear normal. A middle ear effusion (small) is present. Nose: Nose normal.      Mouth/Throat:      Mouth: Mucous membranes are moist.      Pharynx: Oropharynx is clear. No oropharyngeal exudate or posterior oropharyngeal erythema. Eyes:      Extraocular Movements: Extraocular movements intact. Conjunctiva/sclera: Conjunctivae normal.   Cardiovascular:      Rate and Rhythm: Normal rate and regular rhythm. Heart sounds: Normal heart sounds. Pulmonary:      Effort: Pulmonary effort is normal.      Breath sounds: Normal breath sounds. Musculoskeletal:         General: Normal range of motion. Cervical back: Neck supple. Skin:     General: Skin is warm. Neurological:      General: No focal deficit present. Mental Status: She is alert and oriented to person, place, and time.    Psychiatric:         Mood and Affect: Mood normal.         Behavior: Behavior normal.       ASSESSMENT/PLAN:     Kassandra was seen today for uri. Diagnoses and all orders for this visit:    Viral URI  -     COVID-19    Discussed the following with Kassandra:    COVID PCR test pending - will call you with results. Colds last on avg 7-10 days with peak in symptoms on days 3-5. Recommend symptomatic treamtent with rest, fluids, good handwashing. The following may also be helpful for symptom management:    >Sudafed 12 hr for nasal congestion. This is behind the pharmacy counter and will require that you show your license to purchase. >Benadryl 25-50mg at bedtime for nasal drainage (and may help with sleep). >Over the counter pain relievers acetaminophen and/or ibuprofen as needed and do not exceed daily amount printed on label. >Saltwater gargles, soft foods, cool liquids, over the counter cepacol throat lozenges as needed for sore throat. >Delsym as needed for cough. Follow directions on label. >1-2 tsp of honey before bedtime as needed for cough. **Antibiotics are NOT helpful and can be harmful when used to treat colds due to the possibility of unwanted side effects and the development of resistant bacteria. If you have symptoms that last for 10 days without improvement then follow-up. Return for follow-up if you develop a new fever or severe/worsening symptoms at any point . Recommend the following to enhance your immune system -    Vitamin D3 5,000 IU daily. Can continue on 2,000-5,000 IU after illness has resolved. Vitamin C 1,000mg twice daily. Can continue vitamin C 1,000-2,000mg daily for health maintenance once illness has resolved. Zinc 50mg daily. Drop down to 15-25mg daily for general respiratory virus prevention once illness has resolved. Kassandra indicates understanding and is agreeable to plan. Return if symptoms worsen or fail to improve.      Current Outpatient Medications   Medication Sig Dispense Refill    omeprazole (PRILOSEC) 20 MG delayed release capsule Take 1 capsule by mouth Daily 30 capsule 5    VENTOLIN  (90 Base) MCG/ACT inhaler INHALE 1 PUFF BY MOUTH EVERY FOUR HOURS AS NEEDED      HYDROcodone-acetaminophen (NORCO) 5-325 MG per tablet TAKE 1 TABLET BY MOUTH EVERY DAY FOR 7 DAYS      ondansetron (ZOFRAN-ODT) 4 MG disintegrating tablet DISSOLVE 1 TABLET IN MOUTH EVERY DAY      NURTEC 75 MG TBDP       zolpidem (AMBIEN) 10 MG tablet TAKE 1 TABLET BY MOUTH AT BEDTIME      Erenumab-aooe (AIMOVIG SC) Inject into the skin Once a month      SUMAtriptan (IMITREX) 50 MG tablet Take 100 mg by mouth as needed       topiramate (TOPAMAX) 100 MG tablet Take 100 mg by mouth 2 times daily      propranolol (INDERAL LA) 120 MG extended release capsule Take 160 mg by mouth daily       No current facility-administered medications for this visit. Patient Instructions   COVID PCR test pending - will call you with results. Colds last on avg 7-10 days with peak in symptoms on days 3-5. Recommend symptomatic treamtent with rest, fluids, good handwashing. The following may also be helpful for symptom management:    >Sudafed 12 hr for nasal congestion. This is behind the pharmacy counter and will require that you show your license to purchase. >Benadryl 25-50mg at bedtime for nasal drainage (and may help with sleep). >Over the counter pain relievers acetaminophen and/or ibuprofen as needed and do not exceed daily amount printed on label. >Saltwater gargles, soft foods, cool liquids, over the counter cepacol throat lozenges as needed for sore throat. >Delsym as needed for cough. Follow directions on label. >1-2 tsp of honey before bedtime as needed for cough. **Antibiotics are NOT helpful and can be harmful when used to treat colds due to the possibility of unwanted side effects and the development of resistant bacteria. If you have symptoms that last for 10 days without improvement then follow-up.  Return for follow-up if you develop a new fever or severe/worsening symptoms at any point . Recommend the following to enhance your immune system -    Vitamin D3 5,000 IU daily. Can continue on 2,000-5,000 IU after illness has resolved. Vitamin C 1,000mg twice daily. Can continue vitamin C 1,000-2,000mg daily for health maintenance once illness has resolved. Zinc 50mg daily. Drop down to 15-25mg daily for general respiratory virus prevention once illness has resolved.          ControlledSubstances Monitoring:

## 2022-12-29 LAB
SARS-COV-2: NOT DETECTED
SOURCE: NORMAL

## 2023-02-09 ENCOUNTER — OFFICE VISIT (OUTPATIENT)
Dept: GASTROENTEROLOGY | Age: 38
End: 2023-02-09
Payer: COMMERCIAL

## 2023-02-09 VITALS
BODY MASS INDEX: 26.94 KG/M2 | WEIGHT: 188.2 LBS | HEART RATE: 82 BPM | DIASTOLIC BLOOD PRESSURE: 86 MMHG | SYSTOLIC BLOOD PRESSURE: 122 MMHG | OXYGEN SATURATION: 97 % | HEIGHT: 70 IN | TEMPERATURE: 97.9 F

## 2023-02-09 DIAGNOSIS — R10.9 STOMACH ACHE: ICD-10-CM

## 2023-02-09 DIAGNOSIS — K59.04 CHRONIC IDIOPATHIC CONSTIPATION: Primary | ICD-10-CM

## 2023-02-09 DIAGNOSIS — K21.9 GASTROESOPHAGEAL REFLUX DISEASE WITHOUT ESOPHAGITIS: ICD-10-CM

## 2023-02-09 DIAGNOSIS — R10.33 PERIUMBILICAL ABDOMINAL PAIN: ICD-10-CM

## 2023-02-09 PROCEDURE — 99213 OFFICE O/P EST LOW 20 MIN: CPT | Performed by: NURSE PRACTITIONER

## 2023-02-09 PROCEDURE — G8484 FLU IMMUNIZE NO ADMIN: HCPCS | Performed by: NURSE PRACTITIONER

## 2023-02-09 PROCEDURE — G8419 CALC BMI OUT NRM PARAM NOF/U: HCPCS | Performed by: NURSE PRACTITIONER

## 2023-02-09 PROCEDURE — 1036F TOBACCO NON-USER: CPT | Performed by: NURSE PRACTITIONER

## 2023-02-09 PROCEDURE — G8427 DOCREV CUR MEDS BY ELIG CLIN: HCPCS | Performed by: NURSE PRACTITIONER

## 2023-02-09 RX ORDER — LINACLOTIDE 290 UG/1
290 CAPSULE, GELATIN COATED ORAL
Qty: 90 CAPSULE | Refills: 3 | Status: SHIPPED | OUTPATIENT
Start: 2023-02-09

## 2023-02-09 RX ORDER — OMEPRAZOLE 40 MG/1
40 CAPSULE, DELAYED RELEASE ORAL
Qty: 90 CAPSULE | Refills: 3 | Status: SHIPPED | OUTPATIENT
Start: 2023-02-09

## 2023-02-09 RX ORDER — SIMETHICONE 125 MG
125 TABLET,CHEWABLE ORAL EVERY 6 HOURS PRN
Qty: 60 TABLET | Refills: 3 | Status: SHIPPED | OUTPATIENT
Start: 2023-02-09

## 2023-02-09 RX ORDER — LINACLOTIDE 145 UG/1
CAPSULE, GELATIN COATED ORAL
COMMUNITY
Start: 2023-02-03 | End: 2023-02-09 | Stop reason: DRUGHIGH

## 2023-02-09 RX ORDER — FAMOTIDINE 40 MG/1
40 TABLET, FILM COATED ORAL EVERY EVENING
Qty: 30 TABLET | Refills: 3 | Status: SHIPPED | OUTPATIENT
Start: 2023-02-09

## 2023-02-09 ASSESSMENT — ENCOUNTER SYMPTOMS
VOMITING: 0
EYE PAIN: 0
ABDOMINAL PAIN: 1
NAUSEA: 1
BACK PAIN: 1
BLOOD IN STOOL: 0
PHOTOPHOBIA: 0
COUGH: 0
WHEEZING: 0
CONSTIPATION: 0
COLOR CHANGE: 0
DIARRHEA: 0
SHORTNESS OF BREATH: 0

## 2023-02-09 NOTE — PROGRESS NOTES
Kassandra Lagos 30 y.o. female was seen by TIMO Strange on 2/9/2023     Wt Readings from Last 3 Encounters:   02/09/23 188 lb 3.2 oz (85.4 kg)   12/28/22 188 lb (85.3 kg)   03/17/22 186 lb (84.4 kg)       HPI  Kassandra Rey is a pleasant 30 y.o.  female who presents today for abdominal pain, acid reflux, and chronic constipation. She has a past medical history of abdominal hernia, arthritis, chronic back pain, chronic migraine w/o aura, not intractable, w stat migr, history of kidney stones, hypertension, and TMJ (dislocation of temporomandibular joint). She denies recent NSAID use. Her EGD/colonoscopy were done by Dr. Jocy Linares on 9-. Her EGD revealed normal esophagus, mildly erythematous mucosa in the stomach antrum and normal duodenum. Her biopsies revealed mild chronic inactive gastritis that were negative for H. Pylori and Celiac Sprue. Her colonoscopy revealed internal grade 1 hemorrhoids and normal colon. [facility] showed non-obstructing right renal calculus, mild urinary bladder wall thickening may relate to underdistention or cystitis. She has followed up with Urologist.  Her appetite is fair and weight is stable. Her abdominal pain started last week described as a sharp stabbing on her right side to periumbilical area. Her last episode of pain was last night. Her pain improves with laying on left side. Her pain is worse with activity and lifting. Eating dairy worsens her pain. Intermittent nausea that is worse at night. No vomiting. No current abdominal pain, bloating or distention. Her heartburn and acid reflux has worsened in the last two weeks. Prilosec is fairly controlling. Nocturnal awakenigns with acid reflux three times a week. No dysphagia or pain with swallowing. She has excess belching or flatulence. Her bowel pattern is once a week with hard stools.   She is taking Linzess 145 mcg not helping as much as it used to.  Ongoing constipation.  No diarrhea.  No blood in her stools or melena.  No family history of stomach or colon cancer.        ROS  Review of Systems   Constitutional:  Positive for appetite change. Negative for chills, diaphoresis, fatigue, fever and unexpected weight change.   HENT:  Positive for ear pain. Negative for hearing loss and tinnitus.    Eyes:  Negative for photophobia, pain and visual disturbance.   Respiratory:  Negative for cough, shortness of breath and wheezing.    Cardiovascular:  Positive for chest pain. Negative for palpitations and leg swelling.   Gastrointestinal:  Positive for abdominal pain and nausea. Negative for blood in stool, constipation, diarrhea and vomiting.   Endocrine: Negative for cold intolerance, heat intolerance and polydipsia.   Genitourinary:  Negative for dysuria, frequency and urgency.   Musculoskeletal:  Positive for back pain. Negative for myalgias and neck pain.   Skin:  Negative for color change, pallor and rash.   Allergic/Immunologic: Positive for food allergies (eggs). Negative for environmental allergies.   Neurological:  Positive for headaches. Negative for dizziness, seizures and weakness.   Hematological:  Bruises/bleeds easily.   Psychiatric/Behavioral:  Positive for sleep disturbance. Negative for dysphoric mood and suicidal ideas. The patient is not nervous/anxious.      Allergies  Allergies   Allergen Reactions    Tramadol Itching       Medications  Current Outpatient Medications   Medication Sig Dispense Refill    LINZESS 145 MCG capsule       omeprazole (PRILOSEC) 20 MG delayed release capsule Take 1 capsule by mouth Daily 30 capsule 5    VENTOLIN  (90 Base) MCG/ACT inhaler INHALE 1 PUFF BY MOUTH EVERY FOUR HOURS AS NEEDED      NURTEC 75 MG TBDP       zolpidem (AMBIEN) 10 MG tablet TAKE 1 TABLET BY MOUTH AT BEDTIME      Erenumab-aooe (AIMOVIG SC) Inject into the skin Once a month      SUMAtriptan (IMITREX) 50 MG tablet Take 100 mg by  mouth as needed       topiramate (TOPAMAX) 100 MG tablet Take 100 mg by mouth 2 times daily      propranolol (INDERAL LA) 120 MG extended release capsule Take 160 mg by mouth daily       No current facility-administered medications for this visit. Past medical history:   She has a past medical history of Abdominal hernia, Arthritis, Chronic back pain, Chronic migraine w/o aura, not intractable, w stat migr, History of kidney stones, Hypertension, and TMJ (dislocation of temporomandibular joint). Past surgical history:  She has a past surgical history that includes  section (2006, ( with BPS)); Endoscopy, colon, diagnostic (2018); Colonoscopy (2018); and hernia repair (2018). Social History:  She reports that she has never smoked. She has never used smokeless tobacco. She reports that she does not drink alcohol and does not use drugs. Family history:  Her family history includes Breast Cancer in her maternal aunt; Cancer in her maternal grandmother; Diabetes in her maternal grandmother; High Blood Pressure in her mother; No Known Problems in her sister and sister. Objective    Vitals:    23 1537   BP: 122/86   Pulse: 82   Temp: 97.9 °F (36.6 °C)   SpO2: 97%        Physical exam    Physical Exam  Vitals reviewed. Constitutional:       General: She is not in acute distress. Appearance: Normal appearance. She is well-developed. She is not ill-appearing, toxic-appearing or diaphoretic. HENT:      Head: Normocephalic and atraumatic. Nose: Nose normal.      Mouth/Throat:      Mouth: Mucous membranes are moist.   Eyes:      Conjunctiva/sclera: Conjunctivae normal.      Pupils: Pupils are equal, round, and reactive to light. Neck:      Thyroid: No thyromegaly. Vascular: No JVD. Trachea: No tracheal deviation. Cardiovascular:      Rate and Rhythm: Normal rate and regular rhythm. Pulses: Normal pulses.       Heart sounds: Normal heart sounds. No murmur heard. No friction rub. No gallop. Pulmonary:      Effort: Pulmonary effort is normal. No respiratory distress. Breath sounds: Normal breath sounds. No stridor. No wheezing, rhonchi or rales. Chest:      Chest wall: No tenderness. Abdominal:      General: Bowel sounds are normal. There is no distension. Palpations: Abdomen is soft. There is no mass. Tenderness: There is no abdominal tenderness. There is no guarding or rebound. Hernia: No hernia is present. Musculoskeletal:         General: Normal range of motion. Cervical back: Normal range of motion and neck supple. Lymphadenopathy:      Cervical: No cervical adenopathy. Skin:     General: Skin is warm and dry. Neurological:      Mental Status: She is alert and oriented to person, place, and time. Psychiatric:         Mood and Affect: Mood normal.       Hospital Outpatient Visit on 12/28/2022   Component Date Value Ref Range Status    Source 12/28/2022 THROAT   Final    SARS-CoV-2 12/28/2022 NOT DETECTED  NOT DETECTED Final    Comment:         The specimen is NEGATIVE for SARS-CoV-2, the Novel Coronavirus associated with COVID-19. A negaitve test does not rule out COVID-19. This test has been authorized by the FDA under an Emergency Use Authorization (EUA) for use   by authorized laboratories. [employer] SARS-CoV2 reagents for [employer] are designed to detect the virus that causes   COVID-19 in patients with signs and symptoms of infection who are suspected of COVID-19. An individual without symptoms of COVID-19 and who is not shedding SARS-CoV-2 virus would   expect to have a negaitve (not detected) result in this assay. Fact sheet for Healthcare Providers: [employer]  Fact sheet for Patients: [employer]Update[employer].nz          Methodology: RT-PCR         Assessment and Plan:  1. Periumbilical abdominal pain most likely related to IBS. Will increase Linzess to 290 mcg take once daily. The patient was encouraged to increase her fruit, fiber and fluids. 2.  Chronic constipation most likely diet related or possible irritable bowel syndrome. She has tried Trulance, Miralax and stool softeners with no help. Will increase Linzess to 290 mcg samples take once daily for treatment of constipation. The patient was encouraged to eat a high fiber diet, drink plenty of water, avoid foods that are constipating and exercise daily. 3.  GERD with mild worsening. Will increase Prilosec to 40 mg. The patient was encouraged to continue with anti-reflux measures, avoid over eating, avoid foods that worsen and eating late at night. 4.  Intermittent stomach ache most likely acid reflux related. Recommend continue taking Prilosec. Continue with anti-reflux measures, avoid NSAID's.  5.  The patient was encouraged to follow-up in 3-6 months. Total time:  25 minutes.

## 2023-02-21 ENCOUNTER — HOSPITAL ENCOUNTER (OUTPATIENT)
Dept: GENERAL RADIOLOGY | Age: 38
Discharge: HOME | End: 2023-02-21
Payer: COMMERCIAL

## 2023-02-21 ENCOUNTER — HOSPITAL ENCOUNTER (OUTPATIENT)
Age: 38
Discharge: HOME | End: 2023-02-21
Payer: COMMERCIAL

## 2023-02-21 DIAGNOSIS — R52 PAIN: ICD-10-CM

## 2023-02-21 DIAGNOSIS — M79.662 PAIN IN LEFT SHIN: ICD-10-CM

## 2023-02-21 DIAGNOSIS — M79.672 LEFT FOOT PAIN: ICD-10-CM

## 2023-02-21 DIAGNOSIS — M79.641 RIGHT HAND PAIN: ICD-10-CM

## 2023-02-21 PROCEDURE — 73130 X-RAY EXAM OF HAND: CPT

## 2023-02-21 PROCEDURE — 73610 X-RAY EXAM OF ANKLE: CPT

## 2023-02-21 PROCEDURE — 73630 X-RAY EXAM OF FOOT: CPT

## 2023-02-21 PROCEDURE — 73590 X-RAY EXAM OF LOWER LEG: CPT

## 2023-02-21 PROCEDURE — 73110 X-RAY EXAM OF WRIST: CPT

## 2023-03-28 ENCOUNTER — HOSPITAL ENCOUNTER (OUTPATIENT)
Age: 38
Discharge: HOME | End: 2023-03-28
Payer: COMMERCIAL

## 2023-03-28 LAB
25(OH)D3 SERPL-MCNC: 14.2 NG/ML
ALBUMIN SERPL-MCNC: 4.2 GM/DL (ref 3.4–5)
ALP BLD-CCNC: 79 IU/L (ref 40–129)
ALT SERPL-CCNC: 7 U/L (ref 10–40)
ANION GAP SERPL CALCULATED.3IONS-SCNC: 7 MMOL/L (ref 4–16)
AST SERPL-CCNC: 11 IU/L (ref 15–37)
BACTERIA: NEGATIVE /HPF
BASOPHILS ABSOLUTE: 0.1 K/CU MM
BASOPHILS RELATIVE PERCENT: 0.6 % (ref 0–1)
BILIRUB SERPL-MCNC: 0.6 MG/DL (ref 0–1)
BILIRUBIN DIRECT: 0.2 MG/DL (ref 0–0.3)
BILIRUBIN URINE: NEGATIVE MG/DL
BILIRUBIN, INDIRECT: 0.4 MG/DL (ref 0–0.7)
BLOOD, URINE: ABNORMAL
BUN SERPL-MCNC: 9 MG/DL (ref 6–23)
CALCIUM SERPL-MCNC: 9.1 MG/DL (ref 8.3–10.6)
CHLORIDE BLD-SCNC: 108 MMOL/L (ref 99–110)
CHOLEST SERPL-MCNC: 167 MG/DL
CLARITY: CLEAR
CO2: 25 MMOL/L (ref 21–32)
COLOR: YELLOW
CREAT SERPL-MCNC: 0.7 MG/DL (ref 0.6–1.1)
CREAT UR-MCNC: 272.9 MG/DL (ref 28–217)
DIFFERENTIAL TYPE: ABNORMAL
EOSINOPHILS ABSOLUTE: 0.2 K/CU MM
EOSINOPHILS RELATIVE PERCENT: 2 % (ref 0–3)
ERYTHROCYTE SEDIMENTATION RATE: 11 MM/HR (ref 0–20)
ESTIMATED AVERAGE GLUCOSE: 91 MG/DL
GFR SERPL CREATININE-BSD FRML MDRD: >60 ML/MIN/1.73M2
GLUCOSE SERPL-MCNC: 105 MG/DL (ref 70–99)
GLUCOSE, URINE: NEGATIVE MG/DL
HBA1C MFR BLD: 4.8 % (ref 4.2–6.3)
HCT VFR BLD CALC: 40.4 % (ref 37–47)
HDLC SERPL-MCNC: 49 MG/DL
HEMOGLOBIN: 13.5 GM/DL (ref 12.5–16)
IMMATURE NEUTROPHIL %: 0.2 % (ref 0–0.43)
KETONES, URINE: NEGATIVE MG/DL
LDLC SERPL CALC-MCNC: 104 MG/DL
LEUKOCYTE ESTERASE, URINE: NEGATIVE
LYMPHOCYTES ABSOLUTE: 3 K/CU MM
LYMPHOCYTES RELATIVE PERCENT: 34.1 % (ref 24–44)
MAGNESIUM: 2 MG/DL (ref 1.8–2.4)
MCH RBC QN AUTO: 30.4 PG (ref 27–31)
MCHC RBC AUTO-ENTMCNC: 33.4 % (ref 32–36)
MCV RBC AUTO: 91 FL (ref 78–100)
MICROALBUMIN 24H UR-MCNC: <1.2 MG/DL
MICROALBUMIN/CREAT UR-RTO: ABNORMAL MG/G CREAT (ref 0–30)
MONOCYTES ABSOLUTE: 0.6 K/CU MM
MONOCYTES RELATIVE PERCENT: 6.4 % (ref 0–4)
MUCUS: ABNORMAL HPF
NITRITE URINE, QUANTITATIVE: NEGATIVE
NUCLEATED RBC %: 0 %
PDW BLD-RTO: 12.6 % (ref 11.7–14.9)
PH, URINE: 6 (ref 5–8)
PLATELET # BLD: 323 K/CU MM (ref 140–440)
PMV BLD AUTO: 9.7 FL (ref 7.5–11.1)
POTASSIUM SERPL-SCNC: 4 MMOL/L (ref 3.5–5.1)
PROTEIN UA: NEGATIVE MG/DL
RBC # BLD: 4.44 M/CU MM (ref 4.2–5.4)
RBC URINE: 3 /HPF (ref 0–6)
SEGMENTED NEUTROPHILS ABSOLUTE COUNT: 5 K/CU MM
SEGMENTED NEUTROPHILS RELATIVE PERCENT: 56.7 % (ref 36–66)
SODIUM BLD-SCNC: 140 MMOL/L (ref 135–145)
SPECIFIC GRAVITY UA: >1.03 (ref 1–1.03)
SQUAMOUS EPITHELIAL: 2 /HPF
TOTAL IMMATURE NEUTOROPHIL: 0.02 K/CU MM
TOTAL NUCLEATED RBC: 0 K/CU MM
TOTAL PROTEIN: 6.7 GM/DL (ref 6.4–8.2)
TRICHOMONAS: ABNORMAL /HPF
TRIGL SERPL-MCNC: 71 MG/DL
TSH SERPL DL<=0.005 MIU/L-ACNC: 2.95 UIU/ML (ref 0.27–4.2)
URATE SERPL-MCNC: 3.8 MG/DL (ref 2.6–6)
UROBILINOGEN, URINE: 0.2 MG/DL (ref 0.2–1)
WBC # BLD: 8.9 K/CU MM (ref 4–10.5)
WBC UA: 1 /HPF (ref 0–5)

## 2023-03-28 PROCEDURE — 84550 ASSAY OF BLOOD/URIC ACID: CPT

## 2023-03-28 PROCEDURE — 84443 ASSAY THYROID STIM HORMONE: CPT

## 2023-03-28 PROCEDURE — 83036 HEMOGLOBIN GLYCOSYLATED A1C: CPT

## 2023-03-28 PROCEDURE — 82043 UR ALBUMIN QUANTITATIVE: CPT

## 2023-03-28 PROCEDURE — 82306 VITAMIN D 25 HYDROXY: CPT

## 2023-03-28 PROCEDURE — 80061 LIPID PANEL: CPT

## 2023-03-28 PROCEDURE — 82248 BILIRUBIN DIRECT: CPT

## 2023-03-28 PROCEDURE — 83735 ASSAY OF MAGNESIUM: CPT

## 2023-03-28 PROCEDURE — 81001 URINALYSIS AUTO W/SCOPE: CPT

## 2023-03-28 PROCEDURE — 36415 COLL VENOUS BLD VENIPUNCTURE: CPT

## 2023-03-28 PROCEDURE — 85025 COMPLETE CBC W/AUTO DIFF WBC: CPT

## 2023-03-28 PROCEDURE — 82570 ASSAY OF URINE CREATININE: CPT

## 2023-03-28 PROCEDURE — 85652 RBC SED RATE AUTOMATED: CPT

## 2023-03-28 PROCEDURE — 80053 COMPREHEN METABOLIC PANEL: CPT

## 2023-04-27 ENCOUNTER — PATIENT MESSAGE (OUTPATIENT)
Dept: GASTROENTEROLOGY | Age: 38
End: 2023-04-27

## 2023-10-12 ENCOUNTER — OFFICE VISIT (OUTPATIENT)
Dept: FAMILY MEDICINE CLINIC | Age: 38
End: 2023-10-12
Payer: COMMERCIAL

## 2023-10-12 VITALS
HEIGHT: 60 IN | HEART RATE: 77 BPM | BODY MASS INDEX: 36.12 KG/M2 | OXYGEN SATURATION: 97 % | RESPIRATION RATE: 20 BRPM | SYSTOLIC BLOOD PRESSURE: 114 MMHG | WEIGHT: 184 LBS | DIASTOLIC BLOOD PRESSURE: 74 MMHG | TEMPERATURE: 98 F

## 2023-10-12 DIAGNOSIS — J06.9 VIRAL URI WITH COUGH: Primary | ICD-10-CM

## 2023-10-12 DIAGNOSIS — J02.9 SORE THROAT: ICD-10-CM

## 2023-10-12 LAB — S PYO AG THROAT QL: NORMAL

## 2023-10-12 PROCEDURE — 1036F TOBACCO NON-USER: CPT | Performed by: NURSE PRACTITIONER

## 2023-10-12 PROCEDURE — G8427 DOCREV CUR MEDS BY ELIG CLIN: HCPCS | Performed by: NURSE PRACTITIONER

## 2023-10-12 PROCEDURE — G8484 FLU IMMUNIZE NO ADMIN: HCPCS | Performed by: NURSE PRACTITIONER

## 2023-10-12 PROCEDURE — G8417 CALC BMI ABV UP PARAM F/U: HCPCS | Performed by: NURSE PRACTITIONER

## 2023-10-12 PROCEDURE — 87880 STREP A ASSAY W/OPTIC: CPT | Performed by: NURSE PRACTITIONER

## 2023-10-12 PROCEDURE — 99213 OFFICE O/P EST LOW 20 MIN: CPT | Performed by: NURSE PRACTITIONER

## 2023-10-12 RX ORDER — BENZONATATE 100 MG/1
100 CAPSULE ORAL 3 TIMES DAILY PRN
Qty: 20 CAPSULE | Refills: 0 | Status: SHIPPED | OUTPATIENT
Start: 2023-10-12

## 2023-10-12 ASSESSMENT — ENCOUNTER SYMPTOMS
WHEEZING: 0
SINUS PAIN: 1
SWOLLEN GLANDS: 0
DIARRHEA: 0
COUGH: 1
NAUSEA: 0
ABDOMINAL PAIN: 0
SHORTNESS OF BREATH: 0
VOMITING: 0
RHINORRHEA: 1
CHEST TIGHTNESS: 0
SORE THROAT: 1
SINUS PRESSURE: 1

## 2023-10-12 NOTE — PROGRESS NOTES
Kassandra Ackerman Imus   41 y.o.  female  5      Chief Complaint   Patient presents with    URI        Subjective:  41 y. o.female is here for a follow up. She has the following chronic/acute medical problems:  Patient Active Problem List   Diagnosis    Non morbid obesity due to excess calories    Chronic bilateral low back pain without sciatica    Chronic tension-type headache, not intractable    Migraine without aura and without status migrainosus, not intractable    Change in bowel habit    Generalized abdominal pain    Incarcerated epigastric hernia       URI   This is a new problem. The current episode started yesterday. The problem has been unchanged. There has been no fever. Associated symptoms include congestion, coughing, ear pain, headaches, rhinorrhea, sinus pain and a sore throat. Pertinent negatives include no abdominal pain, chest pain, diarrhea, dysuria, joint pain, joint swelling, nausea, neck pain, plugged ear sensation, rash, sneezing, swollen glands, vomiting or wheezing. She has tried acetaminophen (Sudafed - states took covid test yesterday and it was negative) for the symptoms. The treatment provided no relief. Review of Systems   Constitutional:  Positive for appetite change (decreased) and fatigue. Negative for chills and fever. HENT:  Positive for congestion, ear pain, postnasal drip, rhinorrhea, sinus pressure, sinus pain and sore throat. Negative for sneezing. Respiratory:  Positive for cough. Negative for chest tightness, shortness of breath and wheezing. Cardiovascular:  Negative for chest pain. Gastrointestinal:  Negative for abdominal pain, diarrhea, nausea and vomiting. Genitourinary:  Negative for dysuria. Musculoskeletal:  Negative for joint pain and neck pain. Skin:  Negative for rash. Neurological:  Positive for headaches. Negative for dizziness and light-headedness.        Current Outpatient Medications   Medication Sig Dispense Refill    benzonatate (TESSALON

## 2023-11-02 ENCOUNTER — HOSPITAL ENCOUNTER (OUTPATIENT)
Age: 38
Discharge: HOME | End: 2023-11-02
Payer: COMMERCIAL

## 2023-11-02 ENCOUNTER — HOSPITAL ENCOUNTER (OUTPATIENT)
Dept: GENERAL RADIOLOGY | Age: 38
Discharge: HOME | End: 2023-11-02
Payer: COMMERCIAL

## 2023-11-02 DIAGNOSIS — R05.1 ACUTE COUGH: ICD-10-CM

## 2023-11-02 PROCEDURE — 71046 X-RAY EXAM CHEST 2 VIEWS: CPT

## 2023-12-11 ENCOUNTER — HOSPITAL ENCOUNTER (OUTPATIENT)
Dept: GENERAL RADIOLOGY | Age: 38
Discharge: HOME | End: 2023-12-11
Payer: COMMERCIAL

## 2023-12-11 ENCOUNTER — HOSPITAL ENCOUNTER (OUTPATIENT)
Age: 38
Discharge: HOME | End: 2023-12-11
Payer: COMMERCIAL
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
## 2023-12-11 DIAGNOSIS — R07.89 OTHER CHEST PAIN: ICD-10-CM

## 2023-12-11 PROCEDURE — 71101 X-RAY EXAM UNILAT RIBS/CHEST: CPT

## 2024-03-18 ENCOUNTER — OFFICE VISIT (OUTPATIENT)
Dept: FAMILY MEDICINE CLINIC | Age: 39
End: 2024-03-18
Payer: COMMERCIAL

## 2024-03-18 VITALS
OXYGEN SATURATION: 98 % | SYSTOLIC BLOOD PRESSURE: 130 MMHG | WEIGHT: 189 LBS | TEMPERATURE: 98.6 F | HEART RATE: 101 BPM | DIASTOLIC BLOOD PRESSURE: 82 MMHG | BODY MASS INDEX: 36.91 KG/M2 | RESPIRATION RATE: 16 BRPM

## 2024-03-18 DIAGNOSIS — J06.9 UPPER RESPIRATORY TRACT INFECTION, UNSPECIFIED TYPE: Primary | ICD-10-CM

## 2024-03-18 DIAGNOSIS — J02.9 SORE THROAT: ICD-10-CM

## 2024-03-18 LAB
INFLUENZA A ANTIGEN, POC: NORMAL
INFLUENZA B ANTIGEN, POC: NORMAL
S PYO AG THROAT QL: NORMAL

## 2024-03-18 PROCEDURE — 87880 STREP A ASSAY W/OPTIC: CPT | Performed by: NURSE PRACTITIONER

## 2024-03-18 PROCEDURE — 99213 OFFICE O/P EST LOW 20 MIN: CPT | Performed by: NURSE PRACTITIONER

## 2024-03-18 PROCEDURE — G8417 CALC BMI ABV UP PARAM F/U: HCPCS | Performed by: NURSE PRACTITIONER

## 2024-03-18 PROCEDURE — 87804 INFLUENZA ASSAY W/OPTIC: CPT | Performed by: NURSE PRACTITIONER

## 2024-03-18 PROCEDURE — G8484 FLU IMMUNIZE NO ADMIN: HCPCS | Performed by: NURSE PRACTITIONER

## 2024-03-18 PROCEDURE — 1036F TOBACCO NON-USER: CPT | Performed by: NURSE PRACTITIONER

## 2024-03-18 PROCEDURE — G8427 DOCREV CUR MEDS BY ELIG CLIN: HCPCS | Performed by: NURSE PRACTITIONER

## 2024-03-18 RX ORDER — BENZONATATE 100 MG/1
100 CAPSULE ORAL 3 TIMES DAILY PRN
Qty: 20 CAPSULE | Refills: 0 | Status: SHIPPED | OUTPATIENT
Start: 2024-03-18

## 2024-03-18 RX ORDER — AZITHROMYCIN 250 MG/1
TABLET, FILM COATED ORAL
Qty: 1 PACKET | Refills: 0 | Status: SHIPPED | OUTPATIENT
Start: 2024-03-18

## 2024-03-18 ASSESSMENT — ENCOUNTER SYMPTOMS
SORE THROAT: 1
SHORTNESS OF BREATH: 0
COUGH: 1
SINUS PRESSURE: 1
CHEST TIGHTNESS: 0
SWOLLEN GLANDS: 0
NAUSEA: 1
WHEEZING: 0
ABDOMINAL PAIN: 0
DIARRHEA: 0
VOMITING: 0

## 2024-03-18 NOTE — PROGRESS NOTES
Kassandra Glaser   38 y.o.  female  [birth year]      Chief Complaint   Patient presents with    URI     X4 days, was negative for covid w/ home test.     Otalgia        Subjective:  38 y.o.female is here for a follow up. She has the following chronic/acute medical problems:  Patient Active Problem List   Diagnosis    Non morbid obesity due to excess calories    Chronic bilateral low back pain without sciatica    Chronic tension-type headache, not intractable    Migraine without aura and without status migrainosus, not intractable    Change in bowel habit    Generalized abdominal pain    Incarcerated epigastric hernia       URI   This is a new problem. The current episode started in the past 7 days (Thursday). The problem has been unchanged. Maximum temperature: states low grade. Associated symptoms include congestion, coughing, ear pain, headaches, nausea, rhinorrhea, sinus pain and a sore throat. Pertinent negatives include no abdominal pain, chest pain, diarrhea, dysuria, joint pain, joint swelling, neck pain, plugged ear sensation, rash, sneezing, swollen glands, vomiting or wheezing. Treatments tried: Sudafed - covid test yesterday negative. The treatment provided no relief.       Review of Systems   Constitutional:  Positive for appetite change (decreased), chills, fatigue and fever (states low grade).   HENT:  Positive for congestion, ear pain, postnasal drip, rhinorrhea, sinus pressure, sinus pain and sore throat. Negative for sneezing.    Respiratory:  Positive for cough. Negative for chest tightness, shortness of breath and wheezing.    Cardiovascular:  Negative for chest pain and palpitations.   Gastrointestinal:  Positive for nausea. Negative for abdominal pain, diarrhea and vomiting.   Genitourinary:  Negative for dysuria.   Musculoskeletal:  Positive for myalgias. Negative for joint pain and neck pain.   Skin:  Negative for rash.   Neurological:  Positive for headaches. Negative for dizziness and

## 2024-03-20 RX ORDER — METHYLPREDNISOLONE 4 MG/1
TABLET ORAL
Qty: 1 KIT | Refills: 0 | Status: SHIPPED | OUTPATIENT
Start: 2024-03-20 | End: 2024-03-26

## 2024-04-17 PROBLEM — K80.10 CALCULUS OF GALLBLADDER WITH CHRONIC CHOLECYSTITIS WITHOUT OBSTRUCTION: Status: ACTIVE | Noted: 2024-04-17

## 2024-05-20 PROBLEM — Z98.890 S/P ROBOT-ASSISTED SURGICAL PROCEDURE: Status: ACTIVE | Noted: 2024-05-20

## 2024-05-22 ENCOUNTER — APPOINTMENT (OUTPATIENT)
Dept: CT IMAGING | Age: 39
End: 2024-05-22
Payer: COMMERCIAL

## 2024-05-22 ENCOUNTER — HOSPITAL ENCOUNTER (EMERGENCY)
Age: 39
Discharge: HOME | End: 2024-05-23
Attending: EMERGENCY MEDICINE
Payer: COMMERCIAL

## 2024-05-22 DIAGNOSIS — R10.12 ABDOMINAL PAIN, LEFT UPPER QUADRANT: Primary | ICD-10-CM

## 2024-05-22 LAB
BACTERIA: ABNORMAL /HPF
BASOPHILS ABSOLUTE: 0.1 K/CU MM
BASOPHILS RELATIVE PERCENT: 0.7 % (ref 0–1)
BILIRUBIN, URINE: NEGATIVE MG/DL
BLOOD, URINE: ABNORMAL
CAST TYPE: ABNORMAL /HPF
CLARITY: CLEAR
COLOR: YELLOW
CRYSTAL TYPE: NEGATIVE /HPF
DIFFERENTIAL TYPE: ABNORMAL
EOSINOPHILS ABSOLUTE: 0.3 K/CU MM
EOSINOPHILS RELATIVE PERCENT: 3.1 % (ref 0–3)
EPITHELIAL CELLS, UA: 3 /HPF
GLUCOSE URINE: NEGATIVE MG/DL
HCT VFR BLD CALC: 41.3 % (ref 37–47)
HEMOGLOBIN: 13.6 GM/DL (ref 12.5–16)
IMMATURE NEUTROPHIL %: 0.1 % (ref 0–0.43)
KETONES, URINE: NEGATIVE MG/DL
LEUKOCYTE ESTERASE, URINE: NEGATIVE
LYMPHOCYTES ABSOLUTE: 4 K/CU MM
LYMPHOCYTES RELATIVE PERCENT: 37.2 % (ref 24–44)
MCH RBC QN AUTO: 29.8 PG (ref 27–31)
MCHC RBC AUTO-ENTMCNC: 32.9 % (ref 32–36)
MCV RBC AUTO: 90.6 FL (ref 78–100)
MONOCYTES ABSOLUTE: 0.8 K/CU MM
MONOCYTES RELATIVE PERCENT: 7.1 % (ref 0–4)
NEUTROPHILS ABSOLUTE: 5.6 K/CU MM
NEUTROPHILS RELATIVE PERCENT: 51.8 % (ref 36–66)
NITRITE URINE, QUANTITATIVE: NEGATIVE
PDW BLD-RTO: 13.3 % (ref 11.7–14.9)
PH, URINE: 5.5 (ref 5–8)
PLATELET # BLD: 356 K/CU MM (ref 140–440)
PMV BLD AUTO: 9.5 FL (ref 7.5–11.1)
PROTEIN UA: NEGATIVE MG/DL
RBC # BLD: 4.56 M/CU MM (ref 4.2–5.4)
RBC URINE: 15 /HPF (ref 0–6)
SPECIFIC GRAVITY UA: >1.03 (ref 1–1.03)
TOTAL IMMATURE NEUTOROPHIL: 0.01 K/CU MM
UROBILINOGEN, URINE: 0.2 MG/DL (ref 0.2–1)
WBC # BLD: 10.8 K/CU MM (ref 4–10.5)
WBC UA: 5 /HPF (ref 0–5)

## 2024-05-22 PROCEDURE — 99285 EMERGENCY DEPT VISIT HI MDM: CPT

## 2024-05-22 PROCEDURE — 80053 COMPREHEN METABOLIC PANEL: CPT

## 2024-05-22 PROCEDURE — 96374 THER/PROPH/DIAG INJ IV PUSH: CPT

## 2024-05-22 PROCEDURE — 96375 TX/PRO/DX INJ NEW DRUG ADDON: CPT

## 2024-05-22 PROCEDURE — 83690 ASSAY OF LIPASE: CPT

## 2024-05-22 PROCEDURE — 81001 URINALYSIS AUTO W/SCOPE: CPT

## 2024-05-22 PROCEDURE — 74177 CT ABD & PELVIS W/CONTRAST: CPT

## 2024-05-22 PROCEDURE — 6360000002 HC RX W HCPCS: Performed by: EMERGENCY MEDICINE

## 2024-05-22 PROCEDURE — 85025 COMPLETE CBC W/AUTO DIFF WBC: CPT

## 2024-05-22 RX ORDER — KETOROLAC TROMETHAMINE 30 MG/ML
30 INJECTION, SOLUTION INTRAMUSCULAR; INTRAVENOUS ONCE
Status: COMPLETED | OUTPATIENT
Start: 2024-05-22 | End: 2024-05-22

## 2024-05-22 RX ORDER — ONDANSETRON 2 MG/ML
8 INJECTION INTRAMUSCULAR; INTRAVENOUS ONCE
Status: COMPLETED | OUTPATIENT
Start: 2024-05-22 | End: 2024-05-22

## 2024-05-22 RX ADMIN — ONDANSETRON 8 MG: 2 INJECTION INTRAMUSCULAR; INTRAVENOUS at 23:39

## 2024-05-22 RX ADMIN — KETOROLAC TROMETHAMINE 30 MG: 30 INJECTION, SOLUTION INTRAMUSCULAR; INTRAVENOUS at 23:39

## 2024-05-22 ASSESSMENT — PAIN DESCRIPTION - ORIENTATION: ORIENTATION: LEFT

## 2024-05-22 ASSESSMENT — PAIN DESCRIPTION - LOCATION: LOCATION: ABDOMEN

## 2024-05-22 ASSESSMENT — PAIN SCALES - GENERAL: PAINLEVEL_OUTOF10: 10

## 2024-05-22 ASSESSMENT — PAIN - FUNCTIONAL ASSESSMENT: PAIN_FUNCTIONAL_ASSESSMENT: 0-10

## 2024-05-22 ASSESSMENT — PAIN DESCRIPTION - DESCRIPTORS: DESCRIPTORS: BURNING

## 2024-05-23 VITALS
TEMPERATURE: 97.5 F | RESPIRATION RATE: 17 BRPM | BODY MASS INDEX: 35.14 KG/M2 | DIASTOLIC BLOOD PRESSURE: 78 MMHG | HEART RATE: 72 BPM | SYSTOLIC BLOOD PRESSURE: 133 MMHG | WEIGHT: 179 LBS | HEIGHT: 60 IN | OXYGEN SATURATION: 99 %

## 2024-05-23 LAB
ALBUMIN SERPL-MCNC: 4.3 GM/DL (ref 3.4–5)
ALP BLD-CCNC: 95 IU/L (ref 40–129)
ALT SERPL-CCNC: 6 U/L (ref 10–40)
ANION GAP SERPL CALCULATED.3IONS-SCNC: 12 MMOL/L (ref 7–16)
AST SERPL-CCNC: 12 IU/L (ref 15–37)
BILIRUB SERPL-MCNC: 0.4 MG/DL (ref 0–1)
BUN SERPL-MCNC: 9 MG/DL (ref 6–23)
CALCIUM SERPL-MCNC: 9.4 MG/DL (ref 8.3–10.6)
CHLORIDE BLD-SCNC: 105 MMOL/L (ref 99–110)
CO2: 26 MMOL/L (ref 21–32)
CREAT SERPL-MCNC: 0.7 MG/DL (ref 0.6–1.1)
GFR, ESTIMATED: >90 ML/MIN/1.73M2
GLUCOSE SERPL-MCNC: 84 MG/DL (ref 70–99)
LIPASE: 27 IU/L (ref 13–60)
POTASSIUM SERPL-SCNC: 3.6 MMOL/L (ref 3.5–5.1)
SODIUM BLD-SCNC: 143 MMOL/L (ref 135–145)
TOTAL PROTEIN: 8 GM/DL (ref 6.4–8.2)

## 2024-05-23 PROCEDURE — 6360000004 HC RX CONTRAST MEDICATION: Performed by: EMERGENCY MEDICINE

## 2024-05-23 RX ORDER — OMEPRAZOLE 20 MG/1
20 CAPSULE, DELAYED RELEASE ORAL
Qty: 30 CAPSULE | Refills: 0 | Status: SHIPPED | OUTPATIENT
Start: 2024-05-23

## 2024-05-23 RX ADMIN — IOPAMIDOL 75 ML: 755 INJECTION, SOLUTION INTRAVENOUS at 00:17

## 2024-05-23 ASSESSMENT — PAIN SCALES - GENERAL: PAINLEVEL_OUTOF10: 5

## 2024-05-23 ASSESSMENT — PAIN DESCRIPTION - LOCATION: LOCATION: ABDOMEN

## 2024-05-23 ASSESSMENT — PAIN DESCRIPTION - ORIENTATION: ORIENTATION: LEFT

## 2024-05-23 NOTE — ED PROVIDER NOTES
injection 30 mg (30 mg IntraVENous Given 5/22/24 8672)   ondansetron (ZOFRAN) injection 8 mg (8 mg IntraVENous Given 5/22/24 3539)   iopamidol (ISOVUE-370) 76 % injection 75 mL (75 mLs IntraVENous Given 5/23/24 0017)       Independent Imaging Interpretation by me:     EKG (if obtained): (All EKG's are interpreted by myself in the absence of a cardiologist)     Chronic conditions affecting care:     Discussion with Other Profesionals : None    Social Determinants : None    Records Reviewed : Outpatient Notes general surgery 5/20/2024, follow-up visit status postcholecystectomy with no acute issues    Disposition Considerations (tests considered but not done, Shared Decision Making, Pt Expectation of Test or Tx.):     Appropriate for outpatient management      I am the Primary Clinician of Record.                  Clinical Impression:  1. Abdominal pain, left upper quadrant      (Please note that portions of this note may have been completed with a voice recognition program. Efforts were made to edit the dictations but occasionally words are mis-transcribed.)    MD ANA MARIA Lynn Ryan, MD  05/23/24 4063

## 2024-05-23 NOTE — DISCHARGE INSTR - COC
Continuity of Care Form    Patient Name: Kassandra Glaser   :  1985  MRN:  [id number]    Admit date:  2024  Discharge date:  ***    Code Status Order: No Order   Advance Directives:     Admitting Physician:  No admitting provider for patient encounter.  PCP: Flor Urban MD    Discharging Nurse: ***  Discharging Hospital Unit/Room#: ED-10/E10  Discharging Unit Phone Number: ***    Emergency Contact:   Extended Emergency Contact Information  Primary Emergency Contact: Leeroy Glaser           [facility]  Home Phone: [phone]  Mobile Phone: [phone]  Relation: Spouse  Secondary Emergency Contact: [place] [address] [facility]  Home Phone: [phone]  Mobile Phone: [phone]  Relation: Brother/Sister    Past Surgical History:  Past Surgical History:   Procedure Laterality Date     SECTION  2006, ( with BPS)    COLONOSCOPY  2018    internal grade 1 hemorrhoids    ENDOSCOPY, COLON, DIAGNOSTIC  2018    multiple biopsiesto rule out h pylori and sprue, mild erythematous found in antrum.    HERNIA REPAIR  2018    Repair incarcerated epigastric hernia       Immunization History:   Immunization History   Administered Date(s) Administered    TDaP, ADACEL (age 10y-64y), BOOSTRIX (age 10y+), IM, 0.5mL 2012       Active Problems:  Patient Active Problem List   Diagnosis Code    Non morbid obesity due to excess calories E66.09    Chronic bilateral low back pain without sciatica M54.50, G89.29    Chronic tension-type headache, not intractable G44.229    Migraine without aura and without status migrainosus, not intractable G43.009    Change in bowel habit R19.4    Generalized abdominal pain R10.84    Incarcerated epigastric hernia K43.6    Calculus of gallbladder with chronic cholecystitis without obstruction K80.10    S/P robot-assisted surgical procedure Z98.890       Isolation/Infection:

## 2024-07-10 ENCOUNTER — HOSPITAL ENCOUNTER (OUTPATIENT)
Age: 39
Discharge: HOME | End: 2024-07-10
Payer: COMMERCIAL

## 2024-07-10 LAB
ALBUMIN SERPL-MCNC: 4 GM/DL (ref 3.4–5)
ALP BLD-CCNC: 91 IU/L (ref 40–129)
ALT SERPL-CCNC: 9 U/L (ref 10–40)
ANION GAP SERPL CALCULATED.3IONS-SCNC: 14 MMOL/L (ref 7–16)
AST SERPL-CCNC: 11 IU/L (ref 15–37)
BACTERIA: NEGATIVE /HPF
BILIRUB SERPL-MCNC: 0.4 MG/DL (ref 0–1)
BILIRUBIN DIRECT: 0.2 MG/DL (ref 0–0.3)
BILIRUBIN, INDIRECT: 0.2 MG/DL (ref 0–0.7)
BILIRUBIN, URINE: NEGATIVE MG/DL
BLOOD, URINE: ABNORMAL
BUN SERPL-MCNC: 9 MG/DL (ref 6–23)
CALCIUM OXALATE CRYSTALS: ABNORMAL /HPF
CALCIUM SERPL-MCNC: 9.2 MG/DL (ref 8.3–10.6)
CHLORIDE BLD-SCNC: 106 MMOL/L (ref 99–110)
CHOLESTEROL, FASTING: 178 MG/DL
CLARITY, UA: CLEAR
CO2: 21 MMOL/L (ref 21–32)
COLOR, UA: YELLOW
CREAT SERPL-MCNC: 0.8 MG/DL (ref 0.6–1.1)
GFR, ESTIMATED: >90 ML/MIN/1.73M2
GLUCOSE SERPL-MCNC: 99 MG/DL (ref 70–99)
GLUCOSE URINE: NEGATIVE MG/DL
HCT VFR BLD CALC: 41.4 % (ref 37–47)
HDLC SERPL-MCNC: 47 MG/DL
HEMOGLOBIN: 13.6 GM/DL (ref 12.5–16)
KETONES, URINE: NEGATIVE MG/DL
LDLC SERPL CALC-MCNC: 110 MG/DL
LEUKOCYTE ESTERASE, URINE: NEGATIVE
MAGNESIUM: 2.1 MG/DL (ref 1.8–2.4)
MCH RBC QN AUTO: 30 PG (ref 27–31)
MCHC RBC AUTO-ENTMCNC: 32.9 % (ref 32–36)
MCV RBC AUTO: 91.4 FL (ref 78–100)
MUCUS: ABNORMAL HPF
NITRITE URINE, QUANTITATIVE: NEGATIVE
PDW BLD-RTO: 13 % (ref 11.7–14.9)
PH, URINE: 5.5 (ref 5–8)
PLATELET # BLD: 312 K/CU MM (ref 140–440)
PMV BLD AUTO: 10 FL (ref 7.5–11.1)
POTASSIUM SERPL-SCNC: 4 MMOL/L (ref 3.5–5.1)
PROTEIN UA: NEGATIVE MG/DL
RBC # BLD: 4.53 M/CU MM (ref 4.2–5.4)
RBC URINE: 3 /HPF (ref 0–6)
SED RATE, AUTOMATED: 7 MM/HR (ref 0–20)
SODIUM BLD-SCNC: 141 MMOL/L (ref 135–145)
SPECIFIC GRAVITY UA: 1.02 (ref 1–1.03)
SQUAMOUS EPITHELIAL: 6 /HPF
T4 FREE SERPL-MCNC: 1.39 NG/DL (ref 0.9–1.8)
TOTAL PROTEIN: 7.5 GM/DL (ref 6.4–8.2)
TRICHOMONAS: ABNORMAL /HPF
TRIGLYCERIDE, FASTING: 105 MG/DL
TSH SERPL DL<=0.005 MIU/L-ACNC: 1.81 UIU/ML (ref 0.27–4.2)
URATE SERPL-MCNC: 4.5 MG/DL (ref 2.6–6)
UROBILINOGEN, URINE: 0.2 MG/DL (ref 0.2–1)
WBC # BLD: 11.2 K/CU MM (ref 4–10.5)
WBC UA: <1 /HPF (ref 0–5)

## 2024-07-10 PROCEDURE — 84439 ASSAY OF FREE THYROXINE: CPT

## 2024-07-10 PROCEDURE — 83735 ASSAY OF MAGNESIUM: CPT

## 2024-07-10 PROCEDURE — 80061 LIPID PANEL: CPT

## 2024-07-10 PROCEDURE — 85027 COMPLETE CBC AUTOMATED: CPT

## 2024-07-10 PROCEDURE — 36415 COLL VENOUS BLD VENIPUNCTURE: CPT

## 2024-07-10 PROCEDURE — 84550 ASSAY OF BLOOD/URIC ACID: CPT

## 2024-07-10 PROCEDURE — 84443 ASSAY THYROID STIM HORMONE: CPT

## 2024-07-10 PROCEDURE — 82248 BILIRUBIN DIRECT: CPT

## 2024-07-10 PROCEDURE — 85652 RBC SED RATE AUTOMATED: CPT

## 2024-07-10 PROCEDURE — 82306 VITAMIN D 25 HYDROXY: CPT

## 2024-07-10 PROCEDURE — 81001 URINALYSIS AUTO W/SCOPE: CPT

## 2024-07-10 PROCEDURE — 80053 COMPREHEN METABOLIC PANEL: CPT

## 2024-07-11 LAB — 25(OH)D3 SERPL-MCNC: 17.66 NG/ML

## 2024-08-07 ENCOUNTER — HOSPITAL ENCOUNTER (OUTPATIENT)
Dept: WOMENS IMAGING | Age: 39
Discharge: HOME | End: 2024-08-07
Attending: FAMILY MEDICINE
Payer: COMMERCIAL

## 2024-08-07 DIAGNOSIS — Z12.31 ENCOUNTER FOR SCREENING MAMMOGRAM FOR MALIGNANT NEOPLASM OF BREAST: ICD-10-CM

## 2024-08-07 PROCEDURE — 77063 BREAST TOMOSYNTHESIS BI: CPT

## 2024-08-14 ENCOUNTER — OFFICE VISIT (OUTPATIENT)
Dept: NEUROLOGY | Age: 39
End: 2024-08-14
Payer: COMMERCIAL

## 2024-08-14 VITALS
OXYGEN SATURATION: 99 % | DIASTOLIC BLOOD PRESSURE: 80 MMHG | BODY MASS INDEX: 35.42 KG/M2 | HEIGHT: 60 IN | WEIGHT: 180.4 LBS | SYSTOLIC BLOOD PRESSURE: 132 MMHG | HEART RATE: 73 BPM

## 2024-08-14 DIAGNOSIS — M54.81 BILATERAL OCCIPITAL NEURALGIA: ICD-10-CM

## 2024-08-14 DIAGNOSIS — G43.E19 INTRACTABLE CHRONIC MIGRAINE WITH AURA AND WITHOUT STATUS MIGRAINOSUS: Primary | ICD-10-CM

## 2024-08-14 PROCEDURE — G8417 CALC BMI ABV UP PARAM F/U: HCPCS | Performed by: STUDENT IN AN ORGANIZED HEALTH CARE EDUCATION/TRAINING PROGRAM

## 2024-08-14 PROCEDURE — 1036F TOBACCO NON-USER: CPT | Performed by: STUDENT IN AN ORGANIZED HEALTH CARE EDUCATION/TRAINING PROGRAM

## 2024-08-14 PROCEDURE — G8427 DOCREV CUR MEDS BY ELIG CLIN: HCPCS | Performed by: STUDENT IN AN ORGANIZED HEALTH CARE EDUCATION/TRAINING PROGRAM

## 2024-08-14 PROCEDURE — 99205 OFFICE O/P NEW HI 60 MIN: CPT | Performed by: STUDENT IN AN ORGANIZED HEALTH CARE EDUCATION/TRAINING PROGRAM

## 2024-08-14 RX ORDER — UBROGEPANT 100 MG/1
TABLET ORAL
Qty: 1 TABLET | Refills: 0 | Status: SHIPPED | COMMUNITY
Start: 2024-08-14

## 2024-08-14 RX ORDER — RIMEGEPANT SULFATE 75 MG/75MG
75 TABLET, ORALLY DISINTEGRATING ORAL PRN
Qty: 10 TABLET | Refills: 5 | Status: SHIPPED | OUTPATIENT
Start: 2024-08-14

## 2024-08-14 RX ORDER — UBROGEPANT 100 MG/1
TABLET ORAL
Qty: 2 TABLET | Refills: 0 | Status: SHIPPED | COMMUNITY
Start: 2024-08-14

## 2024-08-14 RX ORDER — ERENUMAB-AOOE 140 MG/ML
140 INJECTION, SOLUTION SUBCUTANEOUS
Qty: 1 ADJUSTABLE DOSE PRE-FILLED PEN SYRINGE | Refills: 5 | Status: SHIPPED | OUTPATIENT
Start: 2024-08-14

## 2024-08-14 NOTE — PROGRESS NOTES
Consult Note  [facility]  Patient Name: Kassandra Glaser  : 1985        Subjective:   Reason for consult:   Patient seen and examined. Chart reviewed in detail.    39 y.o. -female with PMH migraine, HTN, kidney stones presenting to [facility] for headaches and falls.       She tells me her headaches have been ongoing for the past 30 years.   The headache is bifrontal or bioccipital described as throbbing or sometimes sharp in nature. There is associated photo/phonophobia. They admit to nausea. These always last >4 hours and typically last 2 days.     She does not describe aura with their migraine. Discussed increased risk of stroke with migraine aura and decrease subsequently when on appropriate preventative migraine regimen.     Red flag features:     Migraine History:  Triggers:    Baseline headache frequency: 30/30 days per month  Baseline migraine frequency: 20/30 days per month    Prior Preventative medications tried: Propranolol (current, ineffective for migraine but used for htn too), Aimovig (current, decreases the severity but not frequency), Ajovy (worked well but insurance stopped covering), Topamax, gabapentin, tizanidine  Prior abortive medications tried: Nurtec (current, works 50% of time), sumatriptan    In regards to birth planning, she is s/p hysterectomy           No data to display                 Past Medical History:   Diagnosis Date    Abdominal hernia     Arthritis     Chronic back pain     no specific injury    Chronic migraine w/o aura, not intractable, w stat migr     History of kidney stones     \"had kidney stones age 32- passed it\"    Hypertension     TMJ (dislocation of temporomandibular joint)     :   Past Surgical History:   Procedure Laterality Date     SECTION  2006, ( with BPS)    COLONOSCOPY  2018    internal grade 1 hemorrhoids    ENDOSCOPY, COLON, DIAGNOSTIC  2018    multiple biopsiesto rule out h pylori and sprue, mild

## 2024-08-16 ENCOUNTER — TELEPHONE (OUTPATIENT)
Dept: NEUROLOGY | Age: 39
End: 2024-08-16

## 2024-08-16 NOTE — TELEPHONE ENCOUNTER
Received fax from [facility] for nerve block denial, scanned to chart.

## 2024-08-21 ENCOUNTER — HOSPITAL ENCOUNTER (OUTPATIENT)
Dept: ULTRASOUND IMAGING | Age: 39
Discharge: HOME | End: 2024-08-21
Payer: COMMERCIAL

## 2024-08-21 DIAGNOSIS — R92.8 ABNORMAL MAMMOGRAM: ICD-10-CM

## 2024-08-21 PROCEDURE — 76642 ULTRASOUND BREAST LIMITED: CPT

## 2024-08-26 ENCOUNTER — HOSPITAL ENCOUNTER (OUTPATIENT)
Dept: PULMONOLOGY | Age: 39
Discharge: HOME | End: 2024-08-26
Attending: FAMILY MEDICINE

## 2024-08-29 ENCOUNTER — PROCEDURE VISIT (OUTPATIENT)
Dept: NEUROLOGY | Age: 39
End: 2024-08-29

## 2024-08-29 DIAGNOSIS — M54.81 BILATERAL OCCIPITAL NEURALGIA: Primary | ICD-10-CM

## 2024-08-29 RX ORDER — METHYLPREDNISOLONE ACETATE 40 MG/ML
40 INJECTION, SUSPENSION INTRA-ARTICULAR; INTRALESIONAL; INTRAMUSCULAR; SOFT TISSUE ONCE
Status: COMPLETED | OUTPATIENT
Start: 2024-08-29 | End: 2024-08-29

## 2024-08-29 RX ORDER — BUPIVACAINE HYDROCHLORIDE 5 MG/ML
1 INJECTION, SOLUTION EPIDURAL; INTRACAUDAL ONCE
Status: COMPLETED | OUTPATIENT
Start: 2024-08-29 | End: 2024-08-29

## 2024-08-29 RX ADMIN — BUPIVACAINE HYDROCHLORIDE 5 MG: 5 INJECTION, SOLUTION EPIDURAL; INTRACAUDAL at 12:31

## 2024-08-29 RX ADMIN — METHYLPREDNISOLONE ACETATE 40 MG: 40 INJECTION, SUSPENSION INTRA-ARTICULAR; INTRALESIONAL; INTRAMUSCULAR; SOFT TISSUE at 12:29

## 2024-08-29 RX ADMIN — METHYLPREDNISOLONE ACETATE 40 MG: 40 INJECTION, SUSPENSION INTRA-ARTICULAR; INTRALESIONAL; INTRAMUSCULAR; SOFT TISSUE at 12:30

## 2024-08-29 RX ADMIN — BUPIVACAINE HYDROCHLORIDE 5 MG: 5 INJECTION, SOLUTION EPIDURAL; INTRACAUDAL at 12:30

## 2024-08-29 NOTE — PROGRESS NOTES
8/29/24    Kassandra TREADWELL Alfredito  1985    Occipital Nerve Block, Bilateral     The risks and benefits of the injection were discussed with the patient.  The patient agreed to proceed.  The appropriate location was identified and cleaned with alcohol. Needle was inserted and plunger slightly withdrawn which did not produce any blood in the syringe.  Then 40mg of Depo-Medrol (40mg/1cc) mixed with 1cc(s) of 0.5% bupivacaine was injected into the right suboccipital area.  Needle was removed and procedure repeated on the left side.  She tolerated the procedure well and there were no complications.  No blood loss.  The patient was asked to contact us if any new concerns arise.    Diana Hightower DO

## 2024-10-02 ENCOUNTER — OFFICE VISIT (OUTPATIENT)
Dept: NEUROLOGY | Age: 39
End: 2024-10-02
Payer: COMMERCIAL

## 2024-10-02 VITALS
OXYGEN SATURATION: 99 % | DIASTOLIC BLOOD PRESSURE: 78 MMHG | WEIGHT: 177.2 LBS | HEART RATE: 76 BPM | SYSTOLIC BLOOD PRESSURE: 130 MMHG | BODY MASS INDEX: 34.61 KG/M2

## 2024-10-02 DIAGNOSIS — G43.E19 INTRACTABLE CHRONIC MIGRAINE WITH AURA AND WITHOUT STATUS MIGRAINOSUS: Primary | ICD-10-CM

## 2024-10-02 DIAGNOSIS — M54.81 BILATERAL OCCIPITAL NEURALGIA: ICD-10-CM

## 2024-10-02 PROCEDURE — G8427 DOCREV CUR MEDS BY ELIG CLIN: HCPCS | Performed by: NURSE PRACTITIONER

## 2024-10-02 PROCEDURE — G8484 FLU IMMUNIZE NO ADMIN: HCPCS | Performed by: NURSE PRACTITIONER

## 2024-10-02 PROCEDURE — 99214 OFFICE O/P EST MOD 30 MIN: CPT | Performed by: NURSE PRACTITIONER

## 2024-10-02 PROCEDURE — G8417 CALC BMI ABV UP PARAM F/U: HCPCS | Performed by: NURSE PRACTITIONER

## 2024-10-02 PROCEDURE — 1036F TOBACCO NON-USER: CPT | Performed by: NURSE PRACTITIONER

## 2024-10-02 NOTE — PROGRESS NOTES
motor   CN XII: tongue protrudes midline  Motor/Coordination Exam   Power: motor strength appears intact throughout, no arm drift, and normal tone   Coordination: normal finger-to-nose, forearm rotation intact, and rapid alternating movement normal  Gait and Stance   Gait/Posture: station normal, casual gait normal, ambulates independently , tiptoe normal, and steady in Romberg's position with eyes open and closed    Assessment and Plan     Diagnosis Orders   1. Intractable chronic migraine with aura and without status migrainosus  Onabotulinumtoxin A (BOTOX) 200 units injection      2. Bilateral occipital neuralgia        Crystal was seen in neurological follow up in regards to chronic migraine with aura, bilateral occipital neuralgia.   -She had a bilateral occipital nerve block on 8/29/2024-no improvement with her migraines.  She remains on Aimovig 140 mg, still having daily migraines with aura.  Using Nurtec for abortive therapy which is effective 50% of the time.  She has samples of Ubrelvy to use to see if it helps better than Nurtec.  We discussed Botox as a next step for migraine prevention and she is interested.  Briefly discussed the locations for Botox to be injected and the importance of compliance with receiving Botox every 3 months and she was agreeable.  Order was placed.    Return in about 3 months (around 1/2/2025).    Poli Macedo, ELIAS - CNP

## 2024-10-03 ENCOUNTER — TELEPHONE (OUTPATIENT)
Dept: NEUROLOGY | Age: 39
End: 2024-10-03

## 2024-10-07 ENCOUNTER — APPOINTMENT (OUTPATIENT)
Dept: GENERAL RADIOLOGY | Age: 39
End: 2024-10-07
Payer: COMMERCIAL

## 2024-10-07 ENCOUNTER — HOSPITAL ENCOUNTER (EMERGENCY)
Age: 39
Discharge: HOME | End: 2024-10-07
Attending: EMERGENCY MEDICINE
Payer: COMMERCIAL

## 2024-10-07 VITALS
OXYGEN SATURATION: 98 % | RESPIRATION RATE: 14 BRPM | HEIGHT: 60 IN | BODY MASS INDEX: 35.14 KG/M2 | HEART RATE: 85 BPM | WEIGHT: 179 LBS | DIASTOLIC BLOOD PRESSURE: 89 MMHG | SYSTOLIC BLOOD PRESSURE: 131 MMHG | TEMPERATURE: 98.1 F

## 2024-10-07 DIAGNOSIS — S86.911A STRAIN OF RIGHT KNEE, INITIAL ENCOUNTER: Primary | ICD-10-CM

## 2024-10-07 PROCEDURE — 99283 EMERGENCY DEPT VISIT LOW MDM: CPT

## 2024-10-07 PROCEDURE — 73562 X-RAY EXAM OF KNEE 3: CPT

## 2024-10-07 RX ORDER — IBUPROFEN 600 MG/1
600 TABLET, FILM COATED ORAL 3 TIMES DAILY PRN
Qty: 30 TABLET | Refills: 0 | Status: SHIPPED | OUTPATIENT
Start: 2024-10-07

## 2024-10-07 RX ORDER — LIDOCAINE 4 G/G
1 PATCH TOPICAL DAILY
Qty: 30 PATCH | Refills: 0 | Status: SHIPPED | OUTPATIENT
Start: 2024-10-07 | End: 2024-11-06

## 2024-10-07 ASSESSMENT — LIFESTYLE VARIABLES
HOW OFTEN DO YOU HAVE A DRINK CONTAINING ALCOHOL: NEVER
HOW MANY STANDARD DRINKS CONTAINING ALCOHOL DO YOU HAVE ON A TYPICAL DAY: PATIENT DOES NOT DRINK

## 2024-10-07 NOTE — ED TRIAGE NOTES
Pt went to get out of bed this morning and was unable to bear weight on right knee without having pain. Denies any injury.

## 2024-10-09 NOTE — ED PROVIDER NOTES
seen. Negative examination. Electronically signed by Seth Ngo MD    XR KNEE RIGHT (3 VIEWS)   Final Result      No right knee joint effusion is seen.      No arthritic process or joint narrowing is evident.      No fracture or dislocation is seen.      Negative examination.      Electronically signed by Seth Ngo MD            MDM:  CC/HPI Summary, DDx, ED Course, and Reassessment:   39-year-old female with right knee pain that is worse with weightbearing who has no known injury.  She does not have warmth or erythema to suggest infection at this time.  X-rays reassuring.  She was discharged follow-up as an outpatient return if she has any new or worsening symptoms.    ED Course as of 10/09/24 1127   Mon Oct 07, 2024   1534 IMPRESSION:     No right knee joint effusion is seen.     No arthritic process or joint narrowing is evident.     No fracture or dislocation is seen.     Negative examination.     Electronically signed by Seth Ngo MD        Exam Ended: 10/07/24 14:55 EDT       [CB]      ED Course User Index  [CB] Elana Myers DO       Patient was given the following medications:  Medications - No data to display    Discussion with Other Profesionals : None    History from : Patient    Limitations to history : None    Independent Imaging Interpretation by me:     EKG (if obtained): (All EKG's are interpreted by myself in the absence of a cardiologist)   See ED Course     Chronic conditions affecting care: Kassandra Glaser  has a past medical history of Abdominal hernia, Arthritis, Chronic back pain, Chronic migraine w/o aura, not intractable, w stat migr, History of kidney stones, Hypertension, and TMJ (dislocation of temporomandibular joint).    Social Determinants : None    Records Reviewed : None    Disposition Considerations (tests considered but not done, Shared Decision Making, Pt Expectation of Test or Tx.):   Considered knee strain/sprain, infected joint, arthritis, medial meniscus tear,

## 2024-10-22 ENCOUNTER — HOSPITAL ENCOUNTER (EMERGENCY)
Age: 39
Discharge: HOME | End: 2024-10-22
Attending: EMERGENCY MEDICINE
Payer: COMMERCIAL

## 2024-10-22 ENCOUNTER — APPOINTMENT (OUTPATIENT)
Dept: GENERAL RADIOLOGY | Age: 39
End: 2024-10-22
Payer: COMMERCIAL

## 2024-10-22 VITALS
OXYGEN SATURATION: 98 % | HEIGHT: 61 IN | TEMPERATURE: 97.7 F | WEIGHT: 182 LBS | DIASTOLIC BLOOD PRESSURE: 93 MMHG | RESPIRATION RATE: 17 BRPM | BODY MASS INDEX: 34.36 KG/M2 | HEART RATE: 81 BPM | SYSTOLIC BLOOD PRESSURE: 140 MMHG

## 2024-10-22 DIAGNOSIS — R06.00 DYSPNEA AND RESPIRATORY ABNORMALITIES: ICD-10-CM

## 2024-10-22 DIAGNOSIS — J06.9 UPPER RESPIRATORY TRACT INFECTION, UNSPECIFIED TYPE: Primary | ICD-10-CM

## 2024-10-22 DIAGNOSIS — R06.89 DYSPNEA AND RESPIRATORY ABNORMALITIES: ICD-10-CM

## 2024-10-22 DIAGNOSIS — R05.9 COUGH, UNSPECIFIED TYPE: ICD-10-CM

## 2024-10-22 LAB
S PYO AG THROAT QL: NEGATIVE
SARS-COV-2 RDRP RESP QL NAA+PROBE: NOT DETECTED
SPECIMEN DESCRIPTION: NORMAL
SPECIMEN SOURCE: NORMAL

## 2024-10-22 PROCEDURE — 87880 STREP A ASSAY W/OPTIC: CPT

## 2024-10-22 PROCEDURE — 87635 SARS-COV-2 COVID-19 AMP PRB: CPT

## 2024-10-22 PROCEDURE — 87081 CULTURE SCREEN ONLY: CPT

## 2024-10-22 PROCEDURE — 99284 EMERGENCY DEPT VISIT MOD MDM: CPT

## 2024-10-22 PROCEDURE — 71045 X-RAY EXAM CHEST 1 VIEW: CPT

## 2024-10-22 RX ORDER — GUAIFENESIN/DEXTROMETHORPHAN 100-10MG/5
5 SYRUP ORAL 4 TIMES DAILY PRN
Qty: 120 ML | Refills: 0 | Status: SHIPPED | OUTPATIENT
Start: 2024-10-22 | End: 2024-11-01

## 2024-10-22 RX ORDER — ACETAMINOPHEN 500 MG
500 TABLET ORAL 4 TIMES DAILY PRN
Qty: 360 TABLET | Refills: 1 | Status: SHIPPED | OUTPATIENT
Start: 2024-10-22

## 2024-10-22 RX ORDER — AZITHROMYCIN 250 MG/1
TABLET, FILM COATED ORAL
Qty: 1 PACKET | Refills: 0 | Status: SHIPPED | OUTPATIENT
Start: 2024-10-22 | End: 2024-11-01

## 2024-10-22 RX ORDER — BENZONATATE 100 MG/1
100 CAPSULE ORAL 3 TIMES DAILY PRN
Qty: 10 CAPSULE | Refills: 0 | Status: SHIPPED | OUTPATIENT
Start: 2024-10-22 | End: 2024-10-29

## 2024-10-22 RX ORDER — NAPROXEN 500 MG/1
500 TABLET ORAL 2 TIMES DAILY
Qty: 60 TABLET | Refills: 0 | Status: SHIPPED | OUTPATIENT
Start: 2024-10-22

## 2024-10-22 ASSESSMENT — ENCOUNTER SYMPTOMS
SORE THROAT: 1
RHINORRHEA: 1
ALLERGIC/IMMUNOLOGIC NEGATIVE: 1
EYES NEGATIVE: 1
GASTROINTESTINAL NEGATIVE: 1
SHORTNESS OF BREATH: 1
COUGH: 1

## 2024-10-22 ASSESSMENT — PAIN - FUNCTIONAL ASSESSMENT
PAIN_FUNCTIONAL_ASSESSMENT: ACTIVITIES ARE NOT PREVENTED
PAIN_FUNCTIONAL_ASSESSMENT: 0-10

## 2024-10-22 ASSESSMENT — PAIN SCALES - GENERAL: PAINLEVEL_OUTOF10: 10

## 2024-10-22 ASSESSMENT — PAIN DESCRIPTION - LOCATION: LOCATION: GENERALIZED

## 2024-10-22 ASSESSMENT — PAIN DESCRIPTION - DESCRIPTORS: DESCRIPTORS: ACHING

## 2024-10-22 ASSESSMENT — PAIN DESCRIPTION - PAIN TYPE: TYPE: ACUTE PAIN

## 2024-10-22 NOTE — ED PROVIDER NOTES
strep are negative.  Patient likely has viral URI however given that her kids have pneumonia and now she is sick with similar symptoms we will give her cough medicine [employer] work note.  Patient appears well discharged given return precautions and follow-up information.  Otherwise she appears well. I don't think she has ACS/DVT/PE/AAA.    CLINICAL IMPRESSION:  Final diagnoses:   Cough, unspecified type   Dyspnea and respiratory abnormalities   Upper respiratory tract infection, unspecified type       (Please note that portions of this note may have been completed with a voice recognition program. Efforts were made to edit the dictations but occasionally words aremis-transcribed.)    DISPOSITION REFERRAL (if applicable):  Flor Urban MD  [address]  [facility] [facility] [address] [phone]    Schedule an appointment as soon as possible for a visit in 1 day      [facility]  [address] [address]  [phone]    If symptoms worsen      DISPOSITION MEDICATIONS (if applicable):  Discharge Medication List as of 10/22/2024  3:25 PM        START taking these medications    Details   guaiFENesin-dextromethorphan (ROBITUSSIN DM) 100-10 MG/5ML syrup Take 5 mLs by mouth 4 times daily as needed for Cough, Disp-120 mL, R-0Normal      benzonatate (TESSALON PERLES) 100 MG capsule Take 1 capsule by mouth 3 times daily as needed for Cough, Disp-10 capsule, R-0Normal      naproxen (NAPROSYN) 500 MG tablet Take 1 tablet by mouth 2 times daily, Disp-60 tablet, R-0Normal      acetaminophen (TYLENOL) 500 MG tablet Take 1 tablet by mouth 4 times daily as needed for Pain, Disp-360 tablet, R-1Normal      azithromycin (ZITHROMAX) 250 MG tablet Take 2 tablets (500 mg) on Day 1, followed by 1 tablet (250 mg) once daily on Days 2 through 5., Disp-1 packet, R-0Normal                DO Lynn Berry James, DO  10/22/24 3437

## 2024-10-25 LAB
MICROORGANISM SPEC CULT: NORMAL
SPECIMEN DESCRIPTION: NORMAL

## 2025-01-07 ENCOUNTER — TELEPHONE (OUTPATIENT)
Age: 40
End: 2025-01-07

## 2025-01-07 NOTE — TELEPHONE ENCOUNTER
Botox delivered today 1/7/25, delivery verification scanned to chart. Called pt to schedule for 2/13/25.

## 2025-02-13 ENCOUNTER — OFFICE VISIT (OUTPATIENT)
Age: 40
End: 2025-02-13
Payer: COMMERCIAL

## 2025-02-13 DIAGNOSIS — G43.E19 INTRACTABLE CHRONIC MIGRAINE WITH AURA AND WITHOUT STATUS MIGRAINOSUS: Primary | ICD-10-CM

## 2025-02-13 PROCEDURE — 64615 CHEMODENERV MUSC MIGRAINE: CPT | Performed by: STUDENT IN AN ORGANIZED HEALTH CARE EDUCATION/TRAINING PROGRAM

## 2025-02-13 NOTE — PROGRESS NOTES
Follow up for Botox for Chronic Daily Migraine:    Subjective    Ms. Glaser is being see to undergo first set of botox injection for treatment of chronic migraine.    Currently she reports 0 headache free days per month, 30/30 headache days per month, 4-7/30 migraine days per month.  She reports having to miss work/school 3-4 times in the last 3 months and having to cancel social plans 5+ times due to migraine in the last 3 months.  She tells me she has had to utilize urgent care/emergency department 0 for migraine in the last 3 months.    Baseline:       - Headache frequency 30/30 days per month      - Migraine frequency 20-30/30 days per month      - Duration of headaches      - Duration of chronic daily headaches      - Prior Preventative: Propranolol, Aimovig, Ajovy, Topamax, gabapentin, tizanidine, bilateral occipital nerve block      - Prior Abortive: Nurtec, sumatriptan, Ubrelvy      - Current preventative: Aimovig      - Current Abortive: Nurtec, Ubrelvy samples      Examination  A&Ox4  Moving all 4 extremities spontaneously with good symmetry  Cranial nerves grossly intact    Potential risks including (but not exclusively) allergic reaction, pain, bruising, bleeding, muscle weakness, dysphagia, aspiration pneumonia, dry mouth etc. as well as potential benefits of the injections were discussed with the patient. Verbal consent to procede with the treatment was obtained.  200units of botulinum toxin was reconstituted with bacteriostatic normal saline.     Right  5 u ; Left  5 u   Procerus 5   Right Frontalis 10 u ; Left Frontalis 10 u   Right Temporalis 20 u ; Left Temporalis 20 u   Right Occipitalis 15 u ; Left Occipitalis 15 u   Right Upper Cervical 10 u ; Left Upper Cervical 10 u   Right Trapezius 15 u ; Left Trapezius 15 u     The patient received a total of 155 units with 45 units wasted.  The botulinum toxin was provided via prescription    The patient tolerated the procedure well

## 2025-02-20 ENCOUNTER — OFFICE VISIT (OUTPATIENT)
Dept: FAMILY MEDICINE CLINIC | Age: 40
End: 2025-02-20
Payer: COMMERCIAL

## 2025-02-20 VITALS
BODY MASS INDEX: 36.87 KG/M2 | HEART RATE: 84 BPM | RESPIRATION RATE: 16 BRPM | DIASTOLIC BLOOD PRESSURE: 86 MMHG | HEIGHT: 60 IN | TEMPERATURE: 97.7 F | WEIGHT: 187.8 LBS | SYSTOLIC BLOOD PRESSURE: 136 MMHG | OXYGEN SATURATION: 99 %

## 2025-02-20 DIAGNOSIS — J06.9 VIRAL URI WITH COUGH: Primary | ICD-10-CM

## 2025-02-20 DIAGNOSIS — R68.89 FLU-LIKE SYMPTOMS: ICD-10-CM

## 2025-02-20 LAB
INFLUENZA A ANTIGEN, POC: NEGATIVE
INFLUENZA B ANTIGEN, POC: NEGATIVE
LOT EXPIRE DATE: NORMAL
LOT KIT NUMBER: NORMAL
SARS-COV-2, POC: NORMAL
VALID INTERNAL CONTROL: NORMAL
VENDOR AND KIT NAME POC: NORMAL

## 2025-02-20 PROCEDURE — 87428 SARSCOV & INF VIR A&B AG IA: CPT | Performed by: NURSE PRACTITIONER

## 2025-02-20 PROCEDURE — 99213 OFFICE O/P EST LOW 20 MIN: CPT | Performed by: NURSE PRACTITIONER

## 2025-02-20 PROCEDURE — 1036F TOBACCO NON-USER: CPT | Performed by: NURSE PRACTITIONER

## 2025-02-20 PROCEDURE — G8417 CALC BMI ABV UP PARAM F/U: HCPCS | Performed by: NURSE PRACTITIONER

## 2025-02-20 PROCEDURE — G8427 DOCREV CUR MEDS BY ELIG CLIN: HCPCS | Performed by: NURSE PRACTITIONER

## 2025-02-20 RX ORDER — DEXTROMETHORPHAN HYDROBROMIDE, GUAIFENESIN AND PSEUDOEPHEDRINE HYDROCHLORIDE 15; 400; 60 MG/1; MG/1; MG/1
1 TABLET ORAL 2 TIMES DAILY PRN
Qty: 20 TABLET | Refills: 0 | Status: SHIPPED | OUTPATIENT
Start: 2025-02-20 | End: 2025-03-02

## 2025-02-20 NOTE — PROGRESS NOTES
Kassandra Glaser   39 y.o.  female  [birth year]      Chief Complaint   Patient presents with    Vomiting     C/o chills, sweats, vomiting, nausea, chest tightness, fatigue, HA, and cough x 2-3 days        Subjective:  39 y.o.female is here for a follow up. She has the following chronic/acute medical problems:  Patient Active Problem List   Diagnosis    Non morbid obesity due to excess calories    Chronic bilateral low back pain without sciatica    Chronic tension-type headache, not intractable    Migraine without aura and without status migrainosus, not intractable    Change in bowel habit    Generalized abdominal pain    Incarcerated epigastric hernia    Calculus of gallbladder with chronic cholecystitis without obstruction    S/P robot-assisted surgical procedure       HPI   History of Present Illness  The patient presents for evaluation of cough, fatigue, and gastrointestinal symptoms.    She reports experiencing chills, increased fatigue, cough, congestion, ear pain, postnasal drainage, runny nose, sinus pain and pressure, sore throat, shortness of breath, wheezing, chest tightness, nausea, vomiting, body aches, and headaches. She has been experiencing difficulty swallowing. She does not have diarrhea. She has been managing her symptoms with Tylenol, Mucinex, and Tessalon Perles. She has a past medical history of streptococcal infection.          Review of Systems See HPI    Current Outpatient Medications   Medication Sig Dispense Refill    Onabotulinumtoxin A (BOTOX) 200 units injection Inject 200 Units into the muscle every 3 months Inject into head and neck muscles every 12 weeks 1 each 3    acetaminophen (TYLENOL) 500 MG tablet Take 1 tablet by mouth 4 times daily as needed for Pain 360 tablet 1    ibuprofen (ADVIL;MOTRIN) 600 MG tablet Take 1 tablet by mouth 3 times daily as needed for Pain 30 tablet 0    Erenumab-aooe (AIMOVIG) 140 MG/ML SOAJ Inject 140 mg into the skin every 30 days Once a month 1 Adjustable

## 2025-02-24 ENCOUNTER — TRANSCRIBE ORDERS (OUTPATIENT)
Dept: ADMINISTRATIVE | Age: 40
End: 2025-02-24

## 2025-02-24 DIAGNOSIS — R92.8 OTHER ABNORMAL AND INCONCLUSIVE FINDINGS ON DIAGNOSTIC IMAGING OF BREAST: ICD-10-CM

## 2025-02-24 DIAGNOSIS — R05.3 CHRONIC COUGH: ICD-10-CM

## 2025-02-24 DIAGNOSIS — R05.3 CHRONIC COUGHING: Primary | ICD-10-CM

## 2025-03-10 ENCOUNTER — TRANSCRIBE ORDERS (OUTPATIENT)
Dept: ADMINISTRATIVE | Age: 40
End: 2025-03-10

## 2025-03-10 DIAGNOSIS — R92.8 OTH ABN AND INCONCLUSIVE FINDINGS ON DX IMAGING OF BREAST: Primary | ICD-10-CM

## 2025-03-24 DIAGNOSIS — G43.E19 INTRACTABLE CHRONIC MIGRAINE WITH AURA AND WITHOUT STATUS MIGRAINOSUS: ICD-10-CM

## 2025-03-24 NOTE — TELEPHONE ENCOUNTER
Received call from pt requesting refills of Nurtec and Aimovig. Last visit Botox 2/13/25, scheduled f/u visit 4/16/25. Rx pending.     Requested Prescriptions     Pending Prescriptions Disp Refills    NURTEC 75 MG TBDP 10 tablet 5     Sig: Take 75 mg by mouth as needed (no more than 3 days per week, no more than 1 every 24 hours)    Erenumab-aooe (AIMOVIG) 140 MG/ML SOAJ 1 Adjustable Dose Pre-filled Pen Syringe 5     Sig: Inject 140 mg into the skin every 30 days Once a month

## 2025-03-25 RX ORDER — ERENUMAB-AOOE 140 MG/ML
140 INJECTION, SOLUTION SUBCUTANEOUS
Qty: 1 ADJUSTABLE DOSE PRE-FILLED PEN SYRINGE | Refills: 5 | Status: ACTIVE | OUTPATIENT
Start: 2025-03-25

## 2025-03-25 RX ORDER — RIMEGEPANT SULFATE 75 MG/75MG
75 TABLET, ORALLY DISINTEGRATING ORAL PRN
Qty: 10 TABLET | Refills: 5 | Status: ACTIVE | OUTPATIENT
Start: 2025-03-25

## 2025-03-31 ENCOUNTER — TELEPHONE (OUTPATIENT)
Age: 40
End: 2025-03-31

## 2025-04-01 ENCOUNTER — TELEPHONE (OUTPATIENT)
Age: 40
End: 2025-04-01

## 2025-04-02 NOTE — TELEPHONE ENCOUNTER
Received additional questions for Botox PA. Called pt to see if she noticed any benefit with first set of Botox injections although it can take up to three injection cycles. Pt stated she hasn't noticed any improvement. Submitted additional information requested with ov notes through covermymeds.

## 2025-04-16 ENCOUNTER — OFFICE VISIT (OUTPATIENT)
Age: 40
End: 2025-04-16
Payer: COMMERCIAL

## 2025-04-16 VITALS
WEIGHT: 188.6 LBS | HEART RATE: 74 BPM | BODY MASS INDEX: 36.83 KG/M2 | SYSTOLIC BLOOD PRESSURE: 124 MMHG | DIASTOLIC BLOOD PRESSURE: 62 MMHG | OXYGEN SATURATION: 100 %

## 2025-04-16 DIAGNOSIS — G43.E19 INTRACTABLE CHRONIC MIGRAINE WITH AURA AND WITHOUT STATUS MIGRAINOSUS: Primary | ICD-10-CM

## 2025-04-16 DIAGNOSIS — M54.81 BILATERAL OCCIPITAL NEURALGIA: ICD-10-CM

## 2025-04-16 PROCEDURE — 1036F TOBACCO NON-USER: CPT | Performed by: NURSE PRACTITIONER

## 2025-04-16 PROCEDURE — G8417 CALC BMI ABV UP PARAM F/U: HCPCS | Performed by: NURSE PRACTITIONER

## 2025-04-16 PROCEDURE — G8427 DOCREV CUR MEDS BY ELIG CLIN: HCPCS | Performed by: NURSE PRACTITIONER

## 2025-04-16 PROCEDURE — 99212 OFFICE O/P EST SF 10 MIN: CPT | Performed by: NURSE PRACTITIONER

## 2025-04-16 PROCEDURE — 99213 OFFICE O/P EST LOW 20 MIN: CPT | Performed by: NURSE PRACTITIONER

## 2025-04-16 NOTE — PROGRESS NOTES
4/16/25    Kassandra Glaser  1985    Chief Complaint   Patient presents with    Follow-up     Pt here for follow up of Intractable chronic migraine with aura and without status migrainosus         History of Present Illness  Kassandra is a 39 y.o. female presenting today for follow-up of: Chronic migraine with aura and bilateral occipital neuralgia.       She had her first set of Botox for treatment of chronic migraine on 2/13/2025.  She is also on Aimovig and propranolol for preventative therapy.  She uses Nurtec for abortive therapy    She tells me today that she has noticed subtle improvement in her migraines since her first set of Botox but understands that it can take a few rounds before she notices improvement.  She still has a migraine every day but it is less intense.  She tried using Ubrelvy but it was not any more effective than Nurtec.    Baseline headache frequency: 30/30 days per month  Baseline migraine frequency: 20/30 days per month     Prior Preventative medications tried: Propranolol (current, ineffective for migraine but used for htn too), Aimovig (current, decreases the severity but not frequency), Ajovy (worked well but insurance stopped covering), Topamax, gabapentin, tizanidine, Botox  Prior abortive medications tried: Nurtec (current, works 50% of time), sumatriptan    Current Outpatient Medications   Medication Sig Dispense Refill    rimegepant sulfate (NURTEC) 75 MG TBDP Take 75 mg by mouth as needed (no more than 3 days per week, no more than 1 every 24 hours) 10 tablet 5    Erenumab-aooe (AIMOVIG) 140 MG/ML SOAJ Inject 140 mg into the skin every 30 days Once a month 1 Adjustable Dose Pre-filled Pen Syringe 5    Onabotulinumtoxin A (BOTOX) 200 units injection Inject 200 Units into the muscle every 3 months Inject into head and neck muscles every 12 weeks 1 each 3    acetaminophen (TYLENOL) 500 MG tablet Take 1 tablet by mouth 4 times daily as needed for Pain 360 tablet 1    ibuprofen

## 2025-04-25 ENCOUNTER — CLINICAL SUPPORT (OUTPATIENT)
Age: 40
End: 2025-04-25
Payer: COMMERCIAL

## 2025-04-25 DIAGNOSIS — G43.E19 INTRACTABLE CHRONIC MIGRAINE WITH AURA AND WITHOUT STATUS MIGRAINOSUS: Primary | ICD-10-CM

## 2025-04-25 PROCEDURE — PBSHW PBB SHADOW CHARGE: Performed by: STUDENT IN AN ORGANIZED HEALTH CARE EDUCATION/TRAINING PROGRAM

## 2025-04-25 RX ORDER — KETOROLAC TROMETHAMINE 30 MG/ML
30 INJECTION, SOLUTION INTRAMUSCULAR; INTRAVENOUS ONCE
Status: COMPLETED | OUTPATIENT
Start: 2025-04-25 | End: 2025-04-25

## 2025-04-25 RX ADMIN — KETOROLAC TROMETHAMINE 30 MG: 30 INJECTION, SOLUTION INTRAMUSCULAR; INTRAVENOUS at 11:34

## 2025-04-25 NOTE — PROGRESS NOTES
Patient came into clinic for Toradol injection, per Dr Hightower. Patient tolerated injection well, states she has gotten injection in the past without any reactions.

## 2025-05-12 ENCOUNTER — OFFICE VISIT (OUTPATIENT)
Age: 40
End: 2025-05-12
Payer: COMMERCIAL

## 2025-05-12 DIAGNOSIS — G43.709 CHRONIC MIGRAINE W/O AURA W/O STATUS MIGRAINOSUS, NOT INTRACTABLE: Primary | ICD-10-CM

## 2025-05-12 PROCEDURE — 64615 CHEMODENERV MUSC MIGRAINE: CPT | Performed by: STUDENT IN AN ORGANIZED HEALTH CARE EDUCATION/TRAINING PROGRAM

## 2025-05-12 PROCEDURE — PBSHW PBB SHADOW CHARGE: Performed by: STUDENT IN AN ORGANIZED HEALTH CARE EDUCATION/TRAINING PROGRAM

## 2025-05-12 RX ORDER — ERENUMAB-AOOE 140 MG/ML
INJECTION, SOLUTION SUBCUTANEOUS
Qty: 1 ADJUSTABLE DOSE PRE-FILLED PEN SYRINGE | Refills: 0 | COMMUNITY
Start: 2025-05-12 | End: 2025-05-12 | Stop reason: SDUPTHER

## 2025-05-12 RX ORDER — ERENUMAB-AOOE 140 MG/ML
INJECTION, SOLUTION SUBCUTANEOUS
Qty: 1 ADJUSTABLE DOSE PRE-FILLED PEN SYRINGE | Refills: 0 | Status: SHIPPED | COMMUNITY
Start: 2025-05-12

## 2025-05-12 RX ADMIN — ONABOTULINUMTOXINA 165 UNITS: 200 INJECTION, POWDER, LYOPHILIZED, FOR SOLUTION INTRADERMAL; INTRAMUSCULAR at 10:44

## 2025-05-12 NOTE — PROGRESS NOTES
Follow up for Botox for Chronic Daily Migraine:    Subjective    Ms. Glaser is being see to undergo first set of botox injection for treatment of chronic migraine.She is a patient of Dr. Hightower.     Following her first set of injections, she reports noticing no change in her headache frequency or severity.    Currently she reports 4 (0) headache free days per month.  She reports 26 (30)  headache days per month of which 12+ (4-7) are migraine.  She reports having to miss work/school 1-2 (3-4) times in the last 3 months and having to cancel social plans 5+ (stable) times due to migraine in the last 3 months.  She tells me she has had to utilize urgent care/emergency department 0 times for migraine in the last 3 months.    She does admit to jaw clenching and would be agreeable to masseter injections today.    She tells me that she is overdue for her Aimovig injection today.  She states that the pharmacy told her she needs a prior authorization.  She was due for her injection on May 5.  However in review of her records it does appear that a prior authorization is not required.    Baseline:       - Headache frequency 30/30 days per month      - Migraine frequency 20-30/30 days per month      - Duration of headaches      - Duration of chronic daily headaches      - Prior Preventative: Propranolol, Aimovig, Ajovy, Topamax, gabapentin, tizanidine, bilateral occipital nerve block      - Prior Abortive: Nurtec, sumatriptan, Ubrelvy      - Current preventative: Aimovig      - Current Abortive: Nurtec, Ubrelvy samples      Examination  A&Ox4  Moving all 4 extremities spontaneously with good symmetry  Cranial nerves grossly intact    Potential risks including (but not exclusively) allergic reaction, pain, bruising, bleeding, muscle weakness, dysphagia, aspiration pneumonia, dry mouth etc. as well as potential benefits of the injections were discussed with the patient. Verbal consent to procede with the treatment was obtained.

## 2025-06-24 ENCOUNTER — TELEPHONE (OUTPATIENT)
Age: 40
End: 2025-06-24

## 2025-07-16 ENCOUNTER — HOSPITAL ENCOUNTER (OUTPATIENT)
Dept: PULMONOLOGY | Age: 40
Discharge: HOME | End: 2025-07-16
Attending: FAMILY MEDICINE

## 2025-08-11 ENCOUNTER — OFFICE VISIT (OUTPATIENT)
Age: 40
End: 2025-08-11
Payer: COMMERCIAL

## 2025-08-11 ENCOUNTER — TELEPHONE (OUTPATIENT)
Age: 40
End: 2025-08-11

## 2025-08-11 DIAGNOSIS — G43.E19 INTRACTABLE CHRONIC MIGRAINE WITH AURA AND WITHOUT STATUS MIGRAINOSUS: ICD-10-CM

## 2025-08-11 DIAGNOSIS — G43.709 CHRONIC MIGRAINE W/O AURA W/O STATUS MIGRAINOSUS, NOT INTRACTABLE: Primary | ICD-10-CM

## 2025-08-11 PROCEDURE — 64615 CHEMODENERV MUSC MIGRAINE: CPT | Performed by: STUDENT IN AN ORGANIZED HEALTH CARE EDUCATION/TRAINING PROGRAM

## 2025-08-11 PROCEDURE — PBSHW PBB SHADOW CHARGE: Performed by: STUDENT IN AN ORGANIZED HEALTH CARE EDUCATION/TRAINING PROGRAM

## 2025-08-11 RX ORDER — RIMEGEPANT SULFATE 75 MG/75MG
75 TABLET, ORALLY DISINTEGRATING ORAL PRN
Qty: 10 TABLET | Refills: 5 | Status: SHIPPED | OUTPATIENT
Start: 2025-08-11

## 2025-08-11 RX ORDER — ERENUMAB-AOOE 140 MG/ML
INJECTION, SOLUTION SUBCUTANEOUS
Qty: 1 ADJUSTABLE DOSE PRE-FILLED PEN SYRINGE | Refills: 0 | Status: SHIPPED | COMMUNITY
Start: 2025-08-11

## 2025-08-11 RX ADMIN — ONABOTULINUMTOXINA 187.5 UNITS: 200 INJECTION, POWDER, LYOPHILIZED, FOR SOLUTION INTRADERMAL; INTRAMUSCULAR at 09:45

## 2025-08-12 ENCOUNTER — TELEPHONE (OUTPATIENT)
Age: 40
End: 2025-08-12